# Patient Record
Sex: FEMALE | Race: WHITE | NOT HISPANIC OR LATINO | Employment: FULL TIME | URBAN - METROPOLITAN AREA
[De-identification: names, ages, dates, MRNs, and addresses within clinical notes are randomized per-mention and may not be internally consistent; named-entity substitution may affect disease eponyms.]

---

## 2023-08-24 ENCOUNTER — OFFICE VISIT (OUTPATIENT)
Dept: PRIMARY CARE | Facility: CLINIC | Age: 63
End: 2023-08-24
Payer: COMMERCIAL

## 2023-08-24 ENCOUNTER — LAB (OUTPATIENT)
Dept: LAB | Facility: LAB | Age: 63
End: 2023-08-24
Payer: COMMERCIAL

## 2023-08-24 VITALS
TEMPERATURE: 97.1 F | RESPIRATION RATE: 16 BRPM | SYSTOLIC BLOOD PRESSURE: 126 MMHG | BODY MASS INDEX: 24.19 KG/M2 | HEIGHT: 68 IN | OXYGEN SATURATION: 97 % | WEIGHT: 159.6 LBS | HEART RATE: 103 BPM | DIASTOLIC BLOOD PRESSURE: 62 MMHG

## 2023-08-24 DIAGNOSIS — E11.9 TYPE 2 DIABETES MELLITUS WITHOUT COMPLICATION, WITHOUT LONG-TERM CURRENT USE OF INSULIN (MULTI): ICD-10-CM

## 2023-08-24 DIAGNOSIS — I10 PRIMARY HYPERTENSION: ICD-10-CM

## 2023-08-24 DIAGNOSIS — Z12.11 COLON CANCER SCREENING: ICD-10-CM

## 2023-08-24 DIAGNOSIS — F41.9 ANXIETY AND DEPRESSION: ICD-10-CM

## 2023-08-24 DIAGNOSIS — F32.A ANXIETY AND DEPRESSION: ICD-10-CM

## 2023-08-24 DIAGNOSIS — R23.2 HOT FLASHES: ICD-10-CM

## 2023-08-24 DIAGNOSIS — Z12.31 ENCOUNTER FOR SCREENING MAMMOGRAM FOR MALIGNANT NEOPLASM OF BREAST: ICD-10-CM

## 2023-08-24 PROBLEM — J45.901 ASTHMA EXACERBATION (HHS-HCC): Status: ACTIVE | Noted: 2023-08-24

## 2023-08-24 PROBLEM — F40.230: Status: ACTIVE | Noted: 2023-08-24

## 2023-08-24 PROBLEM — T78.40XA ALLERGIES: Status: ACTIVE | Noted: 2023-08-24

## 2023-08-24 PROBLEM — J45.909 ASTHMA (HHS-HCC): Status: ACTIVE | Noted: 2023-08-24

## 2023-08-24 PROBLEM — T14.8XXA MUSCLE STRAIN: Status: ACTIVE | Noted: 2023-08-24

## 2023-08-24 PROBLEM — L65.9 ALOPECIA: Status: ACTIVE | Noted: 2023-08-24

## 2023-08-24 PROBLEM — R03.0 BORDERLINE HYPERTENSION: Status: ACTIVE | Noted: 2023-08-24

## 2023-08-24 PROBLEM — R20.0 NUMBNESS IN FEET: Status: ACTIVE | Noted: 2023-08-24

## 2023-08-24 PROBLEM — M25.551 HIP PAIN, RIGHT: Status: ACTIVE | Noted: 2023-08-24

## 2023-08-24 PROBLEM — H53.9 VISUAL DISTURBANCE: Status: ACTIVE | Noted: 2023-08-24

## 2023-08-24 PROBLEM — G62.9 PN (PERIPHERAL NEUROPATHY): Status: ACTIVE | Noted: 2023-08-24

## 2023-08-24 PROBLEM — R10.31 RIGHT INGUINAL PAIN: Status: ACTIVE | Noted: 2023-08-24

## 2023-08-24 LAB
ALANINE AMINOTRANSFERASE (SGPT) (U/L) IN SER/PLAS: 22 U/L (ref 7–45)
ALBUMIN (G/DL) IN SER/PLAS: 4.7 G/DL (ref 3.4–5)
ALBUMIN (MG/L) IN URINE: 20.4 MG/L
ALBUMIN/CREATININE (UG/MG) IN URINE: 10.7 UG/MG CRT (ref 0–30)
ALKALINE PHOSPHATASE (U/L) IN SER/PLAS: 68 U/L (ref 33–136)
ANION GAP IN SER/PLAS: 14 MMOL/L (ref 10–20)
ASPARTATE AMINOTRANSFERASE (SGOT) (U/L) IN SER/PLAS: 24 U/L (ref 9–39)
BASOPHILS (10*3/UL) IN BLOOD BY AUTOMATED COUNT: 0.06 X10E9/L (ref 0–0.1)
BASOPHILS/100 LEUKOCYTES IN BLOOD BY AUTOMATED COUNT: 0.9 % (ref 0–2)
BILIRUBIN TOTAL (MG/DL) IN SER/PLAS: 0.8 MG/DL (ref 0–1.2)
CALCIUM (MG/DL) IN SER/PLAS: 10.1 MG/DL (ref 8.6–10.3)
CARBON DIOXIDE, TOTAL (MMOL/L) IN SER/PLAS: 25 MMOL/L (ref 21–32)
CHLORIDE (MMOL/L) IN SER/PLAS: 103 MMOL/L (ref 98–107)
CHOLESTEROL (MG/DL) IN SER/PLAS: 327 MG/DL (ref 0–199)
CHOLESTEROL IN HDL (MG/DL) IN SER/PLAS: 54.1 MG/DL
CHOLESTEROL/HDL RATIO: 6
CREATININE (MG/DL) IN SER/PLAS: 1.5 MG/DL (ref 0.5–1.05)
CREATININE (MG/DL) IN URINE: 191 MG/DL (ref 20–320)
EOSINOPHILS (10*3/UL) IN BLOOD BY AUTOMATED COUNT: 0.09 X10E9/L (ref 0–0.7)
EOSINOPHILS/100 LEUKOCYTES IN BLOOD BY AUTOMATED COUNT: 1.3 % (ref 0–6)
ERYTHROCYTE DISTRIBUTION WIDTH (RATIO) BY AUTOMATED COUNT: 13.3 % (ref 11.5–14.5)
ERYTHROCYTE MEAN CORPUSCULAR HEMOGLOBIN CONCENTRATION (G/DL) BY AUTOMATED: 32 G/DL (ref 32–36)
ERYTHROCYTE MEAN CORPUSCULAR VOLUME (FL) BY AUTOMATED COUNT: 93 FL (ref 80–100)
ERYTHROCYTES (10*6/UL) IN BLOOD BY AUTOMATED COUNT: 4.64 X10E12/L (ref 4–5.2)
ESTIMATED AVERAGE GLUCOSE FOR HBA1C: 134 MG/DL
GFR FEMALE: 39 ML/MIN/1.73M2
GLUCOSE (MG/DL) IN SER/PLAS: 151 MG/DL (ref 74–99)
HEMATOCRIT (%) IN BLOOD BY AUTOMATED COUNT: 43.1 % (ref 36–46)
HEMOGLOBIN (G/DL) IN BLOOD: 13.8 G/DL (ref 12–16)
HEMOGLOBIN A1C/HEMOGLOBIN TOTAL IN BLOOD: 6.3 %
IMMATURE GRANULOCYTES/100 LEUKOCYTES IN BLOOD BY AUTOMATED COUNT: 0.3 % (ref 0–0.9)
LDL: 237 MG/DL (ref 0–99)
LEUKOCYTES (10*3/UL) IN BLOOD BY AUTOMATED COUNT: 6.9 X10E9/L (ref 4.4–11.3)
LYMPHOCYTES (10*3/UL) IN BLOOD BY AUTOMATED COUNT: 1.51 X10E9/L (ref 1.2–4.8)
LYMPHOCYTES/100 LEUKOCYTES IN BLOOD BY AUTOMATED COUNT: 21.8 % (ref 13–44)
MONOCYTES (10*3/UL) IN BLOOD BY AUTOMATED COUNT: 0.65 X10E9/L (ref 0.1–1)
MONOCYTES/100 LEUKOCYTES IN BLOOD BY AUTOMATED COUNT: 9.4 % (ref 2–10)
NEUTROPHILS (10*3/UL) IN BLOOD BY AUTOMATED COUNT: 4.59 X10E9/L (ref 1.2–7.7)
NEUTROPHILS/100 LEUKOCYTES IN BLOOD BY AUTOMATED COUNT: 66.3 % (ref 40–80)
PLATELETS (10*3/UL) IN BLOOD AUTOMATED COUNT: 253 X10E9/L (ref 150–450)
POTASSIUM (MMOL/L) IN SER/PLAS: 4.3 MMOL/L (ref 3.5–5.3)
PROTEIN TOTAL: 7.8 G/DL (ref 6.4–8.2)
SODIUM (MMOL/L) IN SER/PLAS: 138 MMOL/L (ref 136–145)
TRIGLYCERIDE (MG/DL) IN SER/PLAS: 179 MG/DL (ref 0–149)
UREA NITROGEN (MG/DL) IN SER/PLAS: 23 MG/DL (ref 6–23)
VLDL: 36 MG/DL (ref 0–40)

## 2023-08-24 PROCEDURE — 3008F BODY MASS INDEX DOCD: CPT | Performed by: FAMILY MEDICINE

## 2023-08-24 PROCEDURE — 99214 OFFICE O/P EST MOD 30 MIN: CPT | Performed by: FAMILY MEDICINE

## 2023-08-24 PROCEDURE — 82043 UR ALBUMIN QUANTITATIVE: CPT

## 2023-08-24 PROCEDURE — 83036 HEMOGLOBIN GLYCOSYLATED A1C: CPT

## 2023-08-24 PROCEDURE — 3078F DIAST BP <80 MM HG: CPT | Performed by: FAMILY MEDICINE

## 2023-08-24 PROCEDURE — 85025 COMPLETE CBC W/AUTO DIFF WBC: CPT

## 2023-08-24 PROCEDURE — 4010F ACE/ARB THERAPY RXD/TAKEN: CPT | Performed by: FAMILY MEDICINE

## 2023-08-24 PROCEDURE — 82570 ASSAY OF URINE CREATININE: CPT

## 2023-08-24 PROCEDURE — 80061 LIPID PANEL: CPT

## 2023-08-24 PROCEDURE — 3074F SYST BP LT 130 MM HG: CPT | Performed by: FAMILY MEDICINE

## 2023-08-24 PROCEDURE — 36415 COLL VENOUS BLD VENIPUNCTURE: CPT

## 2023-08-24 PROCEDURE — 80053 COMPREHEN METABOLIC PANEL: CPT

## 2023-08-24 RX ORDER — OLMESARTAN MEDOXOMIL 20 MG/1
20 TABLET ORAL DAILY
COMMUNITY
Start: 2023-08-17 | End: 2023-11-13

## 2023-08-24 RX ORDER — FLUTICASONE FUROATE, UMECLIDINIUM BROMIDE AND VILANTEROL TRIFENATATE 100; 62.5; 25 UG/1; UG/1; UG/1
1 POWDER RESPIRATORY (INHALATION) DAILY
COMMUNITY
End: 2023-09-05 | Stop reason: SDUPTHER

## 2023-08-24 RX ORDER — FLUOXETINE 10 MG/1
10 CAPSULE ORAL DAILY
Qty: 30 CAPSULE | Refills: 5 | Status: SHIPPED | OUTPATIENT
Start: 2023-08-24 | End: 2024-02-12

## 2023-08-24 RX ORDER — BLOOD SUGAR DIAGNOSTIC
1 STRIP MISCELLANEOUS 2 TIMES DAILY
COMMUNITY
End: 2023-08-24 | Stop reason: ALTCHOICE

## 2023-08-24 RX ORDER — ALBUTEROL SULFATE 90 UG/1
AEROSOL, METERED RESPIRATORY (INHALATION)
COMMUNITY
Start: 2020-02-03 | End: 2024-03-01 | Stop reason: SDUPTHER

## 2023-08-24 RX ORDER — METFORMIN HYDROCHLORIDE 500 MG/1
500 TABLET ORAL 2 TIMES DAILY
COMMUNITY
End: 2023-08-24 | Stop reason: ALTCHOICE

## 2023-08-24 RX ORDER — CETIRIZINE HYDROCHLORIDE 10 MG/1
1 TABLET ORAL DAILY
COMMUNITY
Start: 2020-09-02 | End: 2023-08-24 | Stop reason: ALTCHOICE

## 2023-08-24 ASSESSMENT — PATIENT HEALTH QUESTIONNAIRE - PHQ9
2. FEELING DOWN, DEPRESSED OR HOPELESS: SEVERAL DAYS
SUM OF ALL RESPONSES TO PHQ9 QUESTIONS 1 AND 2: 1
10. IF YOU CHECKED OFF ANY PROBLEMS, HOW DIFFICULT HAVE THESE PROBLEMS MADE IT FOR YOU TO DO YOUR WORK, TAKE CARE OF THINGS AT HOME, OR GET ALONG WITH OTHER PEOPLE: SOMEWHAT DIFFICULT
1. LITTLE INTEREST OR PLEASURE IN DOING THINGS: NOT AT ALL

## 2023-08-24 NOTE — PROGRESS NOTES
Subjective   Patient ID: Aruna De La Rosa is a 63 y.o. female who presents for Anxiety.    HPI    Anxiety  Ongoing for 2 week  SXS:  excessive worry, irritability, palpitations, nervousness, CP  Also night sweat and heat and cold intolerance   Pt is going thru a begin of of break up with fiance   Work is okay.  She is in the process of house hunting.  Denies wanting to harm self, or others.  She is working for full time.  She states her concentration is off, all of sudden she will want to cry.  Denies difficulty sleeping, except night sweats.  Night sweats started with diabetes.  She states she has lost 10 pounds.    She states she has not been on any sugar medications.  She states her sugars have been good.  She gets up once at night to urinate.  Has not been excessively thirsty.    Has had increased belching.  Still has appendix.  Denies abdominal pain.  Did have some right flank pain yesterday, has resolved.  Does not drink milk.    No other concern    Review of systems  ; Patient seen today for exam denies any problems with headaches or vision, denies any shortness of breath chest pain nausea or vomiting, no black stool no blood in the stool no heartburn type symptoms denies any problems with constipation or diarrhea, and no dysuria-type symptoms    The patient's allergies medications were reviewed with them today    The patient's social family and surgical history or also reviewed here today, along with her past medical history.     Objective     Alert and active in  no acute distress  HEENT TMs clear oropharynx negative nares clear no drainage noted neck supple  With no adenopathy   Heart regular rate and rhythm without murmur and no carotid bruits  Lungs- clear to auscultation bilaterally, no wheeze or rhonchi noted  Thyroid -negative masses or nodularity  Abdomen- soft times four quadrants , bowel sounds positive no masses or organomegaly, negative tenderness guarding or rebound  Neurological exam  "unremarkable- DTRs in upper and lower extremities within normal limits.   skin -no lesions noted      /62 (BP Location: Right arm, Patient Position: Sitting, BP Cuff Size: Adult)   Pulse 103   Temp 36.2 °C (97.1 °F) (Temporal)   Resp 16   Ht 1.727 m (5' 8\")   Wt 72.4 kg (159 lb 9.6 oz)   SpO2 97%   BMI 24.27 kg/m²     No Known Allergies    Assessment/Plan   Problem List Items Addressed This Visit       HTN (hypertension)    Type 2 diabetes mellitus without complication (CMS/HCC)     Other Visit Diagnoses       Encounter for screening mammogram for malignant neoplasm of breast        Colon cancer screening        BMI 24.0-24.9, adult              Mammogram ordered.    Cologuard ordered.    She can try Black Cohosh or Amberene OTC for hot flashes.    Labs have been ordered, she/he will have these performed and we will contact her/him with results.  (CBC, CMP, Lipid, A1C, Albumin)    If anything worsens or changes please call us at once, follow up in the office as planned.    Scribe Attestation  By signing my name below, I, Katrina Porter MA, Scribe   attest that this documentation has been prepared under the direction and in the presence of Rajiv Soriano DO.     "

## 2023-08-25 DIAGNOSIS — E78.2 MIXED HYPERLIPIDEMIA: ICD-10-CM

## 2023-08-25 RX ORDER — ROSUVASTATIN CALCIUM 10 MG/1
10 TABLET, COATED ORAL DAILY
COMMUNITY
End: 2023-08-25 | Stop reason: SDUPTHER

## 2023-08-25 NOTE — TELEPHONE ENCOUNTER
[facility] [id number] [id number] Clinical Support Staff  Reviewed her labs her sugars are doing excellent down to 6.3,, she can hold off on medicines if she continues to watch her diet as she has been,, all her other labs are normal except her cholesterol,, the labs reviewed were all normal, except the cholesterol was elevated, we recommend less beef, less fried foods,, and more fruits and vegetables and fish in the diet, with her having diabetes I recommend a cholesterol pill Crestor 10 mg nightly #30 with 2 refills and then recheck lipid profile and ALT in 6 to 8 weeks per lab visit

## 2023-08-28 ENCOUNTER — TELEPHONE (OUTPATIENT)
Dept: PRIMARY CARE | Facility: CLINIC | Age: 63
End: 2023-08-28
Payer: COMMERCIAL

## 2023-08-28 RX ORDER — ROSUVASTATIN CALCIUM 10 MG/1
10 TABLET, COATED ORAL DAILY
Qty: 30 TABLET | Refills: 2 | Status: SHIPPED | OUTPATIENT
Start: 2023-08-28 | End: 2024-01-18

## 2023-08-28 NOTE — TELEPHONE ENCOUNTER
Patient calling, reports that you started her on Fluoxetine on 8-24-23. She reports that she has been on this now for 4 days and has had no improvement.     Please advise

## 2023-09-03 DIAGNOSIS — J45.909 ASTHMA, UNSPECIFIED ASTHMA SEVERITY, UNSPECIFIED WHETHER COMPLICATED, UNSPECIFIED WHETHER PERSISTENT (HHS-HCC): ICD-10-CM

## 2023-09-05 RX ORDER — FLUTICASONE FUROATE, UMECLIDINIUM BROMIDE AND VILANTEROL TRIFENATATE 100; 62.5; 25 UG/1; UG/1; UG/1
1 POWDER RESPIRATORY (INHALATION) DAILY
Qty: 60 EACH | Refills: 4 | Status: SHIPPED | OUTPATIENT
Start: 2023-09-05 | End: 2023-09-08 | Stop reason: SDUPTHER

## 2023-09-05 NOTE — TELEPHONE ENCOUNTER
Per Dr. Soriano : It takes 2 to 3 weeks to kick in,, we started a low dose if she is not feeling improvement after 2 weeks will increase the dose      Patient aware

## 2023-09-08 RX ORDER — FLUTICASONE FUROATE, UMECLIDINIUM BROMIDE AND VILANTEROL TRIFENATATE 100; 62.5; 25 UG/1; UG/1; UG/1
1 POWDER RESPIRATORY (INHALATION) DAILY
Qty: 60 EACH | Refills: 4 | Status: SHIPPED | OUTPATIENT
Start: 2023-09-08 | End: 2024-02-09 | Stop reason: SDUPTHER

## 2023-09-08 NOTE — TELEPHONE ENCOUNTER
Spoke with the pharmacy and they DO NOT have this prescription. Can we re-send this. The patient is out of medication     Medication Name:  Trelegy 100-62.5-25 mcg  Quantity (Optional):    60 Refill: 4  Directions (Optional):   INHALE ONE PUFF BY MOUTH ONCE DAILY     Specific Pharmacy location:  [facility]     Date of last appointment:  8/24/23

## 2023-11-04 DIAGNOSIS — E11.9 TYPE 2 DIABETES MELLITUS WITHOUT COMPLICATION, WITHOUT LONG-TERM CURRENT USE OF INSULIN (MULTI): ICD-10-CM

## 2023-11-06 RX ORDER — LANCETS
EACH MISCELLANEOUS
Qty: 200 EACH | Refills: 1 | Status: SHIPPED | OUTPATIENT
Start: 2023-11-06 | End: 2024-03-07

## 2023-11-08 DIAGNOSIS — E11.9 TYPE 2 DIABETES MELLITUS WITHOUT COMPLICATION, WITHOUT LONG-TERM CURRENT USE OF INSULIN (MULTI): ICD-10-CM

## 2023-11-08 RX ORDER — LANCETS
EACH MISCELLANEOUS
Refills: 0 | OUTPATIENT
Start: 2023-11-08

## 2023-11-10 DIAGNOSIS — I10 HTN (HYPERTENSION), BENIGN: Primary | ICD-10-CM

## 2023-11-13 RX ORDER — OLMESARTAN MEDOXOMIL 20 MG/1
20 TABLET ORAL DAILY
Qty: 90 TABLET | Refills: 0 | Status: SHIPPED | OUTPATIENT
Start: 2023-11-13 | End: 2024-03-01 | Stop reason: SDUPTHER

## 2023-11-16 ENCOUNTER — TELEPHONE (OUTPATIENT)
Dept: PRIMARY CARE | Facility: CLINIC | Age: 63
End: 2023-11-16
Payer: COMMERCIAL

## 2024-01-18 DIAGNOSIS — E78.2 MIXED HYPERLIPIDEMIA: ICD-10-CM

## 2024-01-18 RX ORDER — ROSUVASTATIN CALCIUM 10 MG/1
10 TABLET, COATED ORAL DAILY
Qty: 30 TABLET | Refills: 1 | Status: SHIPPED | OUTPATIENT
Start: 2024-01-18 | End: 2024-03-01 | Stop reason: SDUPTHER

## 2024-02-09 DIAGNOSIS — J45.909 ASTHMA, UNSPECIFIED ASTHMA SEVERITY, UNSPECIFIED WHETHER COMPLICATED, UNSPECIFIED WHETHER PERSISTENT (HHS-HCC): ICD-10-CM

## 2024-02-09 RX ORDER — FLUTICASONE FUROATE, UMECLIDINIUM BROMIDE AND VILANTEROL TRIFENATATE 100; 62.5; 25 UG/1; UG/1; UG/1
1 POWDER RESPIRATORY (INHALATION) DAILY
Qty: 60 EACH | Refills: 0 | Status: SHIPPED | OUTPATIENT
Start: 2024-02-09 | End: 2024-03-01 | Stop reason: SDUPTHER

## 2024-02-12 DIAGNOSIS — F41.9 ANXIETY AND DEPRESSION: ICD-10-CM

## 2024-02-12 DIAGNOSIS — F32.A ANXIETY AND DEPRESSION: ICD-10-CM

## 2024-02-12 RX ORDER — FLUOXETINE 10 MG/1
10 CAPSULE ORAL DAILY
Qty: 30 CAPSULE | Refills: 0 | Status: SHIPPED | OUTPATIENT
Start: 2024-02-12 | End: 2024-03-01 | Stop reason: SDUPTHER

## 2024-03-01 ENCOUNTER — OFFICE VISIT (OUTPATIENT)
Dept: PRIMARY CARE | Facility: CLINIC | Age: 64
End: 2024-03-01
Payer: COMMERCIAL

## 2024-03-01 VITALS
DIASTOLIC BLOOD PRESSURE: 62 MMHG | HEART RATE: 87 BPM | BODY MASS INDEX: 24.43 KG/M2 | RESPIRATION RATE: 16 BRPM | WEIGHT: 161.2 LBS | SYSTOLIC BLOOD PRESSURE: 118 MMHG | OXYGEN SATURATION: 95 % | HEIGHT: 68 IN | TEMPERATURE: 97.4 F

## 2024-03-01 DIAGNOSIS — E78.2 MIXED HYPERLIPIDEMIA: ICD-10-CM

## 2024-03-01 DIAGNOSIS — F32.A ANXIETY AND DEPRESSION: ICD-10-CM

## 2024-03-01 DIAGNOSIS — I10 HTN (HYPERTENSION), BENIGN: ICD-10-CM

## 2024-03-01 DIAGNOSIS — L65.9 ALOPECIA: ICD-10-CM

## 2024-03-01 DIAGNOSIS — F41.9 ANXIETY DISORDER, UNSPECIFIED TYPE: ICD-10-CM

## 2024-03-01 DIAGNOSIS — M81.0 OSTEOPOROSIS, UNSPECIFIED OSTEOPOROSIS TYPE, UNSPECIFIED PATHOLOGICAL FRACTURE PRESENCE: ICD-10-CM

## 2024-03-01 DIAGNOSIS — F41.9 ANXIETY AND DEPRESSION: ICD-10-CM

## 2024-03-01 DIAGNOSIS — E11.9 TYPE 2 DIABETES MELLITUS WITHOUT COMPLICATION, UNSPECIFIED WHETHER LONG TERM INSULIN USE (MULTI): ICD-10-CM

## 2024-03-01 DIAGNOSIS — G58.9 MONONEUROPATHY: ICD-10-CM

## 2024-03-01 DIAGNOSIS — Z00.00 ROUTINE GENERAL MEDICAL EXAMINATION AT A HEALTH CARE FACILITY: Primary | ICD-10-CM

## 2024-03-01 DIAGNOSIS — J45.909 ASTHMA, UNSPECIFIED ASTHMA SEVERITY, UNSPECIFIED WHETHER COMPLICATED, UNSPECIFIED WHETHER PERSISTENT (HHS-HCC): ICD-10-CM

## 2024-03-01 DIAGNOSIS — R03.0 BORDERLINE HYPERTENSION: ICD-10-CM

## 2024-03-01 PROCEDURE — 99396 PREV VISIT EST AGE 40-64: CPT | Performed by: FAMILY MEDICINE

## 2024-03-01 PROCEDURE — 3074F SYST BP LT 130 MM HG: CPT | Performed by: FAMILY MEDICINE

## 2024-03-01 PROCEDURE — 3008F BODY MASS INDEX DOCD: CPT | Performed by: FAMILY MEDICINE

## 2024-03-01 PROCEDURE — 4010F ACE/ARB THERAPY RXD/TAKEN: CPT | Performed by: FAMILY MEDICINE

## 2024-03-01 PROCEDURE — 3078F DIAST BP <80 MM HG: CPT | Performed by: FAMILY MEDICINE

## 2024-03-01 RX ORDER — FLUTICASONE FUROATE, UMECLIDINIUM BROMIDE AND VILANTEROL TRIFENATATE 100; 62.5; 25 UG/1; UG/1; UG/1
1 POWDER RESPIRATORY (INHALATION) DAILY
Qty: 60 EACH | Refills: 2 | Status: SHIPPED | OUTPATIENT
Start: 2024-03-01 | End: 2024-03-14

## 2024-03-01 RX ORDER — ALBUTEROL SULFATE 90 UG/1
1 AEROSOL, METERED RESPIRATORY (INHALATION) EVERY 6 HOURS PRN
Qty: 18 G | Refills: 1 | Status: SHIPPED | OUTPATIENT
Start: 2024-03-01 | End: 2024-05-10

## 2024-03-01 RX ORDER — OLMESARTAN MEDOXOMIL 20 MG/1
20 TABLET ORAL DAILY
Qty: 30 TABLET | Refills: 5 | Status: SHIPPED | OUTPATIENT
Start: 2024-03-01

## 2024-03-01 RX ORDER — ROSUVASTATIN CALCIUM 10 MG/1
10 TABLET, COATED ORAL DAILY
Qty: 30 TABLET | Refills: 5 | Status: SHIPPED | OUTPATIENT
Start: 2024-03-01

## 2024-03-01 RX ORDER — FLUOXETINE 10 MG/1
10 CAPSULE ORAL DAILY
Qty: 30 CAPSULE | Refills: 5 | Status: SHIPPED | OUTPATIENT
Start: 2024-03-01 | End: 2024-04-27 | Stop reason: HOSPADM

## 2024-03-01 ASSESSMENT — PATIENT HEALTH QUESTIONNAIRE - PHQ9
2. FEELING DOWN, DEPRESSED OR HOPELESS: NOT AT ALL
1. LITTLE INTEREST OR PLEASURE IN DOING THINGS: NOT AT ALL
SUM OF ALL RESPONSES TO PHQ9 QUESTIONS 1 AND 2: 0

## 2024-03-01 NOTE — PROGRESS NOTES
Subjective   Patient ID: Aruna De La Rosa is a 63 y.o. female who presents for Annual Exam, Hypertension, and Hyperlipidemia.    HPI    Annual physical   Eats a generally healthy diet   Exercises 3 x week  Denies any chest pain,SOB  No Abdominal pain   No black or bloody stools   Urination/BM normal   Last eye apt 6/23  Last dental apt 6 month  Last Gyn apt 10 years  No new family h/o cancers or heart disease    HTN and HLD follow up  Denies swelling, headaches, lightheadedness or dizziness.   Does not check BP at home.   Currently taking olmesartan, rosuvastatin     States she has been waking up in the morning congested.  She states the drainage becomes so hard that she cannot breath through her nose.     No other concern /question       Review of systems  ; Patient seen today for exam denies any problems with headaches or vision, denies any shortness of breath chest pain nausea or vomiting, no black stool no blood in the stool no heartburn type symptoms denies any problems with constipation or diarrhea, and no dysuria-type symptoms    The patient's allergies medications were reviewed with them today    The patient's social family and surgical history or also reviewed here today, along with her past medical history.     Objective     Alert and active in  no acute distress  HEENT TMs clear oropharynx negative nares clear no drainage noted neck supple  With no adenopathy   Heart regular rate and rhythm without murmur and no carotid bruits  Lungs- clear to auscultation bilaterally, no wheeze or rhonchi noted  Thyroid -negative masses or nodularity  Abdomen- soft times four quadrants , bowel sounds positive no masses or organomegaly, negative tenderness guarding or rebound  Neurological exam unremarkable- DTRs in upper and lower extremities within normal limits.   skin -no lesions noted      /62 (BP Location: Left arm, Patient Position: Sitting, BP Cuff Size: Adult)   Pulse 87   Temp 36.3 °C (97.4 °F) (Temporal)   " Resp 16   Ht 1.727 m (5' 8\")   Wt 73.1 kg (161 lb 3.2 oz)   SpO2 95%   BMI 24.51 kg/m²     No Known Allergies    Assessment/Plan   Problem List Items Addressed This Visit       Alopecia    Anxiety disorder    Asthma    Borderline hypertension    PN (peripheral neuropathy)    Type 2 diabetes mellitus without complication (CMS/[facility])    BMI 24.0-24.9, adult    Routine general medical examination at a health care facility - Primary     Other Visit Diagnoses       Anxiety and depression        HTN (hypertension), benign        Mixed hyperlipidemia              Refill Albuterol, Fluoxetine, Trelegy, Olmesartan, Rosuvastatin.  She can try to hold off on the Trelegy to see how she does without it.     Recommend a humidifier in her bedroom.    Reminded to have Cologuard performed.     Reminded to have mammogram performed.  DEXA ordered.    Recommend good posture.    Follow up in 6 months or sooner if necessary.    If anything worsens or changes please call us at once, follow up in the office as planned.    Scribe Attestation  By signing my name below, I, Katrina Porter MA, Scribe   attest that this documentation has been prepared under the direction and in the presence of Rajiv Soriano DO.      "

## 2024-03-07 DIAGNOSIS — E11.9 TYPE 2 DIABETES MELLITUS WITHOUT COMPLICATION, WITHOUT LONG-TERM CURRENT USE OF INSULIN (MULTI): ICD-10-CM

## 2024-03-07 RX ORDER — LANCETS
EACH MISCELLANEOUS
Qty: 200 EACH | Refills: 1 | Status: SHIPPED | OUTPATIENT
Start: 2024-03-07 | End: 2024-04-24 | Stop reason: SDUPTHER

## 2024-03-14 DIAGNOSIS — J45.909 ASTHMA, UNSPECIFIED ASTHMA SEVERITY, UNSPECIFIED WHETHER COMPLICATED, UNSPECIFIED WHETHER PERSISTENT (HHS-HCC): ICD-10-CM

## 2024-03-14 RX ORDER — FLUTICASONE FUROATE, UMECLIDINIUM BROMIDE AND VILANTEROL TRIFENATATE 100; 62.5; 25 UG/1; UG/1; UG/1
1 POWDER RESPIRATORY (INHALATION) DAILY
Qty: 60 EACH | Refills: 2 | Status: SHIPPED | OUTPATIENT
Start: 2024-03-14

## 2024-04-22 ENCOUNTER — OFFICE VISIT (OUTPATIENT)
Dept: PRIMARY CARE | Facility: CLINIC | Age: 64
End: 2024-04-22
Payer: COMMERCIAL

## 2024-04-22 ENCOUNTER — TELEPHONE (OUTPATIENT)
Dept: PRIMARY CARE | Facility: CLINIC | Age: 64
End: 2024-04-22

## 2024-04-22 VITALS
WEIGHT: 160 LBS | SYSTOLIC BLOOD PRESSURE: 124 MMHG | OXYGEN SATURATION: 97 % | TEMPERATURE: 97.8 F | RESPIRATION RATE: 16 BRPM | DIASTOLIC BLOOD PRESSURE: 72 MMHG | HEART RATE: 78 BPM | HEIGHT: 68 IN | BODY MASS INDEX: 24.25 KG/M2

## 2024-04-22 DIAGNOSIS — R10.31 RIGHT LOWER QUADRANT ABDOMINAL PAIN: ICD-10-CM

## 2024-04-22 DIAGNOSIS — N39.0 ACUTE UTI: Primary | ICD-10-CM

## 2024-04-22 LAB
POC APPEARANCE, URINE: ABNORMAL
POC BILIRUBIN, URINE: ABNORMAL
POC BLOOD, URINE: ABNORMAL
POC COLOR, URINE: ABNORMAL
POC GLUCOSE, URINE: ABNORMAL MG/DL
POC KETONES, URINE: ABNORMAL MG/DL
POC LEUKOCYTES, URINE: ABNORMAL
POC NITRITE,URINE: POSITIVE
POC PH, URINE: 5.5 PH
POC PROTEIN, URINE: ABNORMAL MG/DL
POC SPECIFIC GRAVITY, URINE: 1.02
POC UROBILINOGEN, URINE: >=8 EU/DL

## 2024-04-22 PROCEDURE — 3074F SYST BP LT 130 MM HG: CPT | Performed by: FAMILY MEDICINE

## 2024-04-22 PROCEDURE — 4010F ACE/ARB THERAPY RXD/TAKEN: CPT | Performed by: FAMILY MEDICINE

## 2024-04-22 PROCEDURE — 3078F DIAST BP <80 MM HG: CPT | Performed by: FAMILY MEDICINE

## 2024-04-22 PROCEDURE — 99214 OFFICE O/P EST MOD 30 MIN: CPT | Performed by: FAMILY MEDICINE

## 2024-04-22 PROCEDURE — 87086 URINE CULTURE/COLONY COUNT: CPT

## 2024-04-22 PROCEDURE — 81003 URINALYSIS AUTO W/O SCOPE: CPT | Performed by: FAMILY MEDICINE

## 2024-04-22 PROCEDURE — 3008F BODY MASS INDEX DOCD: CPT | Performed by: FAMILY MEDICINE

## 2024-04-22 RX ORDER — CIPROFLOXACIN 250 MG/1
250 TABLET, FILM COATED ORAL 2 TIMES DAILY
Qty: 14 TABLET | Refills: 0 | Status: SHIPPED | OUTPATIENT
Start: 2024-04-22 | End: 2024-04-27 | Stop reason: HOSPADM

## 2024-04-22 NOTE — LETTER
April 22, 2024     Patient: Aruna De La Rosa   YOB: 1960   Date of Visit: 4/22/2024       To Whom It May Concern:    Aruna De La Rosa was seen in my clinic on 4/22/2024  ?. Please excuse Aruna for her absence from work from 4/18/2024, 4/19/2024 and 4/22/2024.    If you have any questions or concerns, please don't hesitate to call.         Sincerely,         Rajiv Soriano, DO

## 2024-04-22 NOTE — PROGRESS NOTES
"Subjective   Patient ID: Aruna De La Rosa is a 63 y.o. female who presents for Abdominal Pain.    HPI    Patient presents today for abdominal pain.  Ongoing x 4 days ago  Describes achy and at times very sharp  Stools are loose for couple day but is getting more folded   Urination no burning, or pain when urinating   Pt abdominal pain is located lower center to the lower right side   Has tried AZO   She is passing gas, and belching.   Admits to nausea.  She did increase fluids.   She did have chills.   She does have an appetite.     No other concern /question    Review of systems  ; Patient seen today for exam denies any problems with headaches or vision, denies any shortness of breath chest pain nausea or vomiting, no black stool no blood in the stool no heartburn type symptoms denies any problems with constipation or diarrhea, and no dysuria-type symptoms    The patient's allergies medications were reviewed with them today    The patient's social family and surgical history or also reviewed here today, along with her past medical history.     Objective     Alert and active in  no acute distress  HEENT TMs clear oropharynx negative nares clear no drainage noted neck supple  With no adenopathy   Heart regular rate and rhythm without murmur and no carotid bruits  Lungs- clear to auscultation bilaterally, no wheeze or rhonchi noted  Thyroid -negative masses or nodularity  Abdomen- soft times four quadrants , bowel sounds positive no masses or organomegaly, slight tenderness in the right lower quadrant no acute rebound or guarding no signs of acute appendicitis at this time, she is also hungry no fevers no bowel changes  skin -no lesions noted      /72 (BP Location: Left arm, Patient Position: Sitting, BP Cuff Size: Adult)   Pulse 78   Temp 36.6 °C (97.8 °F) (Temporal)   Resp 16   Ht 1.727 m (5' 8\")   Wt 72.6 kg (160 lb)   SpO2 97%   BMI 24.33 kg/m²     No Known Allergies    Assessment/Plan   Problem List " Items Addressed This Visit    None  Visit Diagnoses       Right lower quadrant abdominal pain        Relevant Orders    POCT UA Automated manually resulted (Completed)    Acute UTI        Relevant Medications    ciprofloxacin (Cipro) 250 mg tablet    Other Relevant Orders    Urine Culture          UA performed today, trace blood and large amount of leukocytes.    If she gets worsening pain on the right side, she should go to the ER.     Cipro 250 mg BID prescribed today.  We will send her urine for culture we did discuss appendicitis she does not have any other concerning symptoms at this time if she worsens she will let me know her belly pain and starting goes to the right side that is acutely painful she will go to the hospital at once understands this    Will give me an update 24 hours how things are going culture was sent    If anything worsens or changes please call us at once, follow up in the office as planned.    Scribe Attestation  By signing my name below, I, Katrina Porter MA, Scribe   attest that this documentation has been prepared under the direction and in the presence of Rajiv Soriano DO.

## 2024-04-24 ENCOUNTER — TELEPHONE (OUTPATIENT)
Dept: PRIMARY CARE | Facility: CLINIC | Age: 64
End: 2024-04-24
Payer: COMMERCIAL

## 2024-04-24 DIAGNOSIS — E11.9 TYPE 2 DIABETES MELLITUS WITHOUT COMPLICATION, WITHOUT LONG-TERM CURRENT USE OF INSULIN (MULTI): ICD-10-CM

## 2024-04-24 LAB — BACTERIA UR CULT: NO GROWTH

## 2024-04-24 RX ORDER — LANCETS
EACH MISCELLANEOUS
Qty: 200 EACH | Refills: 1 | Status: SHIPPED | OUTPATIENT
Start: 2024-04-24

## 2024-04-24 NOTE — TELEPHONE ENCOUNTER
UPDATE    &    REQUESTING LETTER FOR WORK?    Patient calling giving an update. She is still having pain but it is not as bad as before. Patient says she is belching. Patient is also requesting a letter to excuse her from work for this week (4/22 -4/26) because the of how bad she's been feeling. Patient is feeling nauseated but not but quite as much.    Please advise and thank you.

## 2024-04-24 NOTE — LETTER
April 24, 2024     Patient: Aruna De La Rosa   YOB: 1960   Date of Visit: 4/24/2024       To Whom It May Concern:     ?Please excuse Aruna for her absence from work on this day to make the appointment.    If you have any questions or concerns, please don't hesitate to call.         Sincerely,         Rajiv Soriano, DO

## 2024-04-24 NOTE — LETTER
April 24, 2024     Patient: Aruna De La Rosa   YOB: 1960   Date of Visit: 4/24/2024       To Whom It May Concern:    Please excuse Aruna for her absence from work starting Monday April 22nd through Friday April 26th.  If you have any questions or concerns, please don't hesitate to call.         Sincerely,         Rajiv Soriano, DO

## 2024-04-24 NOTE — LETTER
April 24, 2024     Patient: Aruna De La Rosa   YOB: 1960   Date of Visit: 4/24/2024       To Whom It May Concern:    Aruna De La Rosa was seen in my clinic on 4/24/2024 at ?. Please excuse Aruna for her absence from work starting Monday April 22nd through Friday April 26th.       If you have any questions or concerns, please don't hesitate to call.         Sincerely,         Rajiv Soriano, DO

## 2024-04-24 NOTE — TELEPHONE ENCOUNTER
Rx Refill Request Telephone Encounter    Name:  Aruna De La Rosa  :  [id number]  Medication Name:  Accu-Chek Softclix Lancets misc     Specific Pharmacy location:  [facility]   Date of last appointment:  2024  Date of next appointment: none    Best number to reach patient:  [phone]

## 2024-04-25 NOTE — TELEPHONE ENCOUNTER
Pt calling back, she states that she is not worse but about the same. She wanted to know if you can order the CT scan?    Please advise    Last office visit:4/22/24

## 2024-04-26 ENCOUNTER — APPOINTMENT (OUTPATIENT)
Dept: RADIOLOGY | Facility: HOSPITAL | Age: 64
End: 2024-04-26
Payer: COMMERCIAL

## 2024-04-26 ENCOUNTER — HOSPITAL ENCOUNTER (OUTPATIENT)
Facility: HOSPITAL | Age: 64
Setting detail: OBSERVATION
Discharge: HOME | End: 2024-04-27
Attending: STUDENT IN AN ORGANIZED HEALTH CARE EDUCATION/TRAINING PROGRAM | Admitting: INTERNAL MEDICINE
Payer: COMMERCIAL

## 2024-04-26 DIAGNOSIS — K35.80 ACUTE APPENDICITIS, UNSPECIFIED ACUTE APPENDICITIS TYPE: ICD-10-CM

## 2024-04-26 DIAGNOSIS — K35.30 ACUTE APPENDICITIS WITH LOCALIZED PERITONITIS, WITHOUT PERFORATION, ABSCESS, OR GANGRENE: Primary | ICD-10-CM

## 2024-04-26 LAB
ALBUMIN SERPL BCP-MCNC: 4.4 G/DL (ref 3.4–5)
ALP SERPL-CCNC: 89 U/L (ref 33–136)
ALT SERPL W P-5'-P-CCNC: 15 U/L (ref 7–45)
ANION GAP SERPL CALC-SCNC: 12 MMOL/L (ref 10–20)
APPEARANCE UR: CLEAR
AST SERPL W P-5'-P-CCNC: 16 U/L (ref 9–39)
BASOPHILS # BLD AUTO: 0.05 X10*3/UL (ref 0–0.1)
BASOPHILS NFR BLD AUTO: 0.5 %
BILIRUB SERPL-MCNC: 0.5 MG/DL (ref 0–1.2)
BILIRUB UR STRIP.AUTO-MCNC: NEGATIVE MG/DL
BUN SERPL-MCNC: 13 MG/DL (ref 6–23)
CALCIUM SERPL-MCNC: 9.7 MG/DL (ref 8.6–10.3)
CHLORIDE SERPL-SCNC: 102 MMOL/L (ref 98–107)
CO2 SERPL-SCNC: 28 MMOL/L (ref 21–32)
COLOR UR: YELLOW
CREAT SERPL-MCNC: 1.12 MG/DL (ref 0.5–1.05)
EGFRCR SERPLBLD CKD-EPI 2021: 55 ML/MIN/1.73M*2
EOSINOPHIL # BLD AUTO: 0.16 X10*3/UL (ref 0–0.7)
EOSINOPHIL NFR BLD AUTO: 1.5 %
ERYTHROCYTE [DISTWIDTH] IN BLOOD BY AUTOMATED COUNT: 11.9 % (ref 11.5–14.5)
GLUCOSE BLD MANUAL STRIP-MCNC: 231 MG/DL (ref 74–99)
GLUCOSE BLD MANUAL STRIP-MCNC: 87 MG/DL (ref 74–99)
GLUCOSE SERPL-MCNC: 126 MG/DL (ref 74–99)
GLUCOSE UR STRIP.AUTO-MCNC: NEGATIVE MG/DL
HCT VFR BLD AUTO: 40.6 % (ref 36–46)
HGB BLD-MCNC: 13 G/DL (ref 12–16)
HOLD SPECIMEN: NORMAL
HOLD SPECIMEN: NORMAL
IMM GRANULOCYTES # BLD AUTO: 0.04 X10*3/UL (ref 0–0.7)
IMM GRANULOCYTES NFR BLD AUTO: 0.4 % (ref 0–0.9)
KETONES UR STRIP.AUTO-MCNC: NEGATIVE MG/DL
LEUKOCYTE ESTERASE UR QL STRIP.AUTO: ABNORMAL
LIPASE SERPL-CCNC: 24 U/L (ref 9–82)
LYMPHOCYTES # BLD AUTO: 1.13 X10*3/UL (ref 1.2–4.8)
LYMPHOCYTES NFR BLD AUTO: 10.3 %
MCH RBC QN AUTO: 29.8 PG (ref 26–34)
MCHC RBC AUTO-ENTMCNC: 32 G/DL (ref 32–36)
MCV RBC AUTO: 93 FL (ref 80–100)
MONOCYTES # BLD AUTO: 1.07 X10*3/UL (ref 0.1–1)
MONOCYTES NFR BLD AUTO: 9.8 %
NEUTROPHILS # BLD AUTO: 8.48 X10*3/UL (ref 1.2–7.7)
NEUTROPHILS NFR BLD AUTO: 77.5 %
NITRITE UR QL STRIP.AUTO: NEGATIVE
NRBC BLD-RTO: 0 /100 WBCS (ref 0–0)
PH UR STRIP.AUTO: 6 [PH]
PLATELET # BLD AUTO: 237 X10*3/UL (ref 150–450)
POTASSIUM SERPL-SCNC: 3.6 MMOL/L (ref 3.5–5.3)
PROT SERPL-MCNC: 7.6 G/DL (ref 6.4–8.2)
PROT UR STRIP.AUTO-MCNC: NEGATIVE MG/DL
RBC # BLD AUTO: 4.36 X10*6/UL (ref 4–5.2)
RBC # UR STRIP.AUTO: NEGATIVE /UL
RBC #/AREA URNS AUTO: NORMAL /HPF
SODIUM SERPL-SCNC: 138 MMOL/L (ref 136–145)
SP GR UR STRIP.AUTO: 1.04
SQUAMOUS #/AREA URNS AUTO: NORMAL /HPF
UROBILINOGEN UR STRIP.AUTO-MCNC: <2 MG/DL
WBC # BLD AUTO: 10.9 X10*3/UL (ref 4.4–11.3)
WBC #/AREA URNS AUTO: NORMAL /HPF

## 2024-04-26 PROCEDURE — 87086 URINE CULTURE/COLONY COUNT: CPT | Mod: STJLAB

## 2024-04-26 PROCEDURE — 2500000004 HC RX 250 GENERAL PHARMACY W/ HCPCS (ALT 636 FOR OP/ED): Performed by: INTERNAL MEDICINE

## 2024-04-26 PROCEDURE — 81001 URINALYSIS AUTO W/SCOPE: CPT

## 2024-04-26 PROCEDURE — 80053 COMPREHEN METABOLIC PANEL: CPT

## 2024-04-26 PROCEDURE — 96361 HYDRATE IV INFUSION ADD-ON: CPT | Performed by: INTERNAL MEDICINE

## 2024-04-26 PROCEDURE — 85025 COMPLETE CBC W/AUTO DIFF WBC: CPT

## 2024-04-26 PROCEDURE — 99285 EMERGENCY DEPT VISIT HI MDM: CPT | Performed by: STUDENT IN AN ORGANIZED HEALTH CARE EDUCATION/TRAINING PROGRAM

## 2024-04-26 PROCEDURE — 99222 1ST HOSP IP/OBS MODERATE 55: CPT | Performed by: SURGERY

## 2024-04-26 PROCEDURE — G0378 HOSPITAL OBSERVATION PER HR: HCPCS

## 2024-04-26 PROCEDURE — 96375 TX/PRO/DX INJ NEW DRUG ADDON: CPT | Performed by: INTERNAL MEDICINE

## 2024-04-26 PROCEDURE — 74177 CT ABD & PELVIS W/CONTRAST: CPT

## 2024-04-26 PROCEDURE — 2550000001 HC RX 255 CONTRASTS: Performed by: STUDENT IN AN ORGANIZED HEALTH CARE EDUCATION/TRAINING PROGRAM

## 2024-04-26 PROCEDURE — 99285 EMERGENCY DEPT VISIT HI MDM: CPT | Mod: 25

## 2024-04-26 PROCEDURE — 96372 THER/PROPH/DIAG INJ SC/IM: CPT | Performed by: INTERNAL MEDICINE

## 2024-04-26 PROCEDURE — 96367 TX/PROPH/DG ADDL SEQ IV INF: CPT | Performed by: INTERNAL MEDICINE

## 2024-04-26 PROCEDURE — 74177 CT ABD & PELVIS W/CONTRAST: CPT | Mod: FOREIGN READ | Performed by: RADIOLOGY

## 2024-04-26 PROCEDURE — 82947 ASSAY GLUCOSE BLOOD QUANT: CPT | Mod: 59

## 2024-04-26 PROCEDURE — 2500000004 HC RX 250 GENERAL PHARMACY W/ HCPCS (ALT 636 FOR OP/ED): Performed by: STUDENT IN AN ORGANIZED HEALTH CARE EDUCATION/TRAINING PROGRAM

## 2024-04-26 PROCEDURE — 96365 THER/PROPH/DIAG IV INF INIT: CPT | Mod: 59 | Performed by: INTERNAL MEDICINE

## 2024-04-26 PROCEDURE — 99222 1ST HOSP IP/OBS MODERATE 55: CPT | Performed by: INTERNAL MEDICINE

## 2024-04-26 PROCEDURE — 36415 COLL VENOUS BLD VENIPUNCTURE: CPT

## 2024-04-26 PROCEDURE — 83690 ASSAY OF LIPASE: CPT

## 2024-04-26 PROCEDURE — 2500000001 HC RX 250 WO HCPCS SELF ADMINISTERED DRUGS (ALT 637 FOR MEDICARE OP): Performed by: INTERNAL MEDICINE

## 2024-04-26 PROCEDURE — 96376 TX/PRO/DX INJ SAME DRUG ADON: CPT | Performed by: INTERNAL MEDICINE

## 2024-04-26 RX ORDER — SODIUM CHLORIDE 9 MG/ML
75 INJECTION, SOLUTION INTRAVENOUS CONTINUOUS
Status: ACTIVE | OUTPATIENT
Start: 2024-04-26 | End: 2024-04-27

## 2024-04-26 RX ORDER — DEXTROSE 50 % IN WATER (D50W) INTRAVENOUS SYRINGE
12.5
Status: DISCONTINUED | OUTPATIENT
Start: 2024-04-26 | End: 2024-04-27 | Stop reason: HOSPADM

## 2024-04-26 RX ORDER — ACETAMINOPHEN 325 MG/1
975 TABLET ORAL EVERY 8 HOURS PRN
Status: DISCONTINUED | OUTPATIENT
Start: 2024-04-26 | End: 2024-04-27 | Stop reason: HOSPADM

## 2024-04-26 RX ORDER — CEFTRIAXONE 1 G/50ML
1 INJECTION, SOLUTION INTRAVENOUS EVERY 24 HOURS
Status: DISCONTINUED | OUTPATIENT
Start: 2024-04-26 | End: 2024-04-27

## 2024-04-26 RX ORDER — OXYCODONE HYDROCHLORIDE 5 MG/1
5 TABLET ORAL EVERY 6 HOURS PRN
Status: DISCONTINUED | OUTPATIENT
Start: 2024-04-26 | End: 2024-04-27 | Stop reason: HOSPADM

## 2024-04-26 RX ORDER — DEXTROSE 50 % IN WATER (D50W) INTRAVENOUS SYRINGE
25
Status: DISCONTINUED | OUTPATIENT
Start: 2024-04-26 | End: 2024-04-27 | Stop reason: HOSPADM

## 2024-04-26 RX ORDER — METRONIDAZOLE 500 MG/100ML
500 INJECTION, SOLUTION INTRAVENOUS EVERY 8 HOURS
Status: DISCONTINUED | OUTPATIENT
Start: 2024-04-26 | End: 2024-04-27 | Stop reason: HOSPADM

## 2024-04-26 RX ORDER — ENOXAPARIN SODIUM 100 MG/ML
40 INJECTION SUBCUTANEOUS EVERY 24 HOURS
Status: DISCONTINUED | OUTPATIENT
Start: 2024-04-26 | End: 2024-04-27 | Stop reason: HOSPADM

## 2024-04-26 RX ORDER — INSULIN LISPRO 100 [IU]/ML
0-10 INJECTION, SOLUTION INTRAVENOUS; SUBCUTANEOUS
Status: DISCONTINUED | OUTPATIENT
Start: 2024-04-26 | End: 2024-04-27 | Stop reason: HOSPADM

## 2024-04-26 RX ORDER — ONDANSETRON 4 MG/1
4 TABLET, ORALLY DISINTEGRATING ORAL EVERY 8 HOURS PRN
Status: DISCONTINUED | OUTPATIENT
Start: 2024-04-26 | End: 2024-04-27 | Stop reason: HOSPADM

## 2024-04-26 RX ORDER — ONDANSETRON HYDROCHLORIDE 2 MG/ML
4 INJECTION, SOLUTION INTRAVENOUS EVERY 8 HOURS PRN
Status: DISCONTINUED | OUTPATIENT
Start: 2024-04-26 | End: 2024-04-27 | Stop reason: HOSPADM

## 2024-04-26 RX ADMIN — METRONIDAZOLE 500 MG: 500 INJECTION, SOLUTION INTRAVENOUS at 17:51

## 2024-04-26 RX ADMIN — SODIUM CHLORIDE 75 ML/HR: 9 INJECTION, SOLUTION INTRAVENOUS at 18:53

## 2024-04-26 RX ADMIN — PIPERACILLIN SODIUM AND TAZOBACTAM SODIUM 3.38 G: 3; .375 INJECTION, SOLUTION INTRAVENOUS at 12:06

## 2024-04-26 RX ADMIN — IOHEXOL 75 ML: 350 INJECTION, SOLUTION INTRAVENOUS at 11:10

## 2024-04-26 RX ADMIN — HYDROMORPHONE HYDROCHLORIDE 0.5 MG: 1 INJECTION, SOLUTION INTRAMUSCULAR; INTRAVENOUS; SUBCUTANEOUS at 16:53

## 2024-04-26 RX ADMIN — OXYCODONE HYDROCHLORIDE 5 MG: 5 TABLET ORAL at 22:14

## 2024-04-26 RX ADMIN — ENOXAPARIN SODIUM 40 MG: 40 INJECTION SUBCUTANEOUS at 16:41

## 2024-04-26 RX ADMIN — HYDROMORPHONE HYDROCHLORIDE 0.5 MG: 1 INJECTION, SOLUTION INTRAMUSCULAR; INTRAVENOUS; SUBCUTANEOUS at 12:07

## 2024-04-26 RX ADMIN — CEFTRIAXONE SODIUM 1 G: 1 INJECTION, SOLUTION INTRAVENOUS at 19:04

## 2024-04-26 SDOH — SOCIAL STABILITY: SOCIAL INSECURITY: DO YOU FEEL UNSAFE GOING BACK TO THE PLACE WHERE YOU ARE LIVING?: NO

## 2024-04-26 SDOH — ECONOMIC STABILITY: TRANSPORTATION INSECURITY
IN THE PAST 12 MONTHS, HAS THE LACK OF TRANSPORTATION KEPT YOU FROM MEDICAL APPOINTMENTS OR FROM GETTING MEDICATIONS?: NO

## 2024-04-26 SDOH — ECONOMIC STABILITY: INCOME INSECURITY: IN THE LAST 12 MONTHS, WAS THERE A TIME WHEN YOU WERE NOT ABLE TO PAY THE MORTGAGE OR RENT ON TIME?: NO

## 2024-04-26 SDOH — SOCIAL STABILITY: SOCIAL INSECURITY: ARE YOU OR HAVE YOU BEEN THREATENED OR ABUSED PHYSICALLY, EMOTIONALLY, OR SEXUALLY BY ANYONE?: NO

## 2024-04-26 SDOH — ECONOMIC STABILITY: HOUSING INSECURITY
IN THE LAST 12 MONTHS, WAS THERE A TIME WHEN YOU DID NOT HAVE A STEADY PLACE TO SLEEP OR SLEPT IN A SHELTER (INCLUDING NOW)?: NO

## 2024-04-26 SDOH — ECONOMIC STABILITY: INCOME INSECURITY: HOW HARD IS IT FOR YOU TO PAY FOR THE VERY BASICS LIKE FOOD, HOUSING, MEDICAL CARE, AND HEATING?: NOT HARD AT ALL

## 2024-04-26 SDOH — ECONOMIC STABILITY: TRANSPORTATION INSECURITY
IN THE PAST 12 MONTHS, HAS LACK OF TRANSPORTATION KEPT YOU FROM MEETINGS, WORK, OR FROM GETTING THINGS NEEDED FOR DAILY LIVING?: NO

## 2024-04-26 SDOH — SOCIAL STABILITY: SOCIAL INSECURITY: ABUSE: ADULT

## 2024-04-26 SDOH — SOCIAL STABILITY: SOCIAL INSECURITY: HAS ANYONE EVER THREATENED TO HURT YOUR FAMILY OR YOUR PETS?: NO

## 2024-04-26 SDOH — ECONOMIC STABILITY: HOUSING INSECURITY: IN THE LAST 12 MONTHS, HOW MANY PLACES HAVE YOU LIVED?: 1

## 2024-04-26 SDOH — SOCIAL STABILITY: SOCIAL INSECURITY: HAVE YOU HAD THOUGHTS OF HARMING ANYONE ELSE?: NO

## 2024-04-26 SDOH — SOCIAL STABILITY: SOCIAL INSECURITY: DO YOU FEEL ANYONE HAS EXPLOITED OR TAKEN ADVANTAGE OF YOU FINANCIALLY OR OF YOUR PERSONAL PROPERTY?: NO

## 2024-04-26 SDOH — SOCIAL STABILITY: SOCIAL INSECURITY: ARE THERE ANY APPARENT SIGNS OF INJURIES/BEHAVIORS THAT COULD BE RELATED TO ABUSE/NEGLECT?: NO

## 2024-04-26 SDOH — SOCIAL STABILITY: SOCIAL INSECURITY: DOES ANYONE TRY TO KEEP YOU FROM HAVING/CONTACTING OTHER FRIENDS OR DOING THINGS OUTSIDE YOUR HOME?: NO

## 2024-04-26 SDOH — SOCIAL STABILITY: SOCIAL INSECURITY: HAVE YOU HAD ANY THOUGHTS OF HARMING ANYONE ELSE?: NO

## 2024-04-26 ASSESSMENT — ACTIVITIES OF DAILY LIVING (ADL)
JUDGMENT_ADEQUATE_SAFELY_COMPLETE_DAILY_ACTIVITIES: YES
BATHING: INDEPENDENT
PATIENT'S MEMORY ADEQUATE TO SAFELY COMPLETE DAILY ACTIVITIES?: YES
GROOMING: INDEPENDENT
ADEQUATE_TO_COMPLETE_ADL: YES
WALKS IN HOME: INDEPENDENT
HEARING - RIGHT EAR: FUNCTIONAL
FEEDING YOURSELF: INDEPENDENT
DRESSING YOURSELF: INDEPENDENT
TOILETING: INDEPENDENT
HEARING - LEFT EAR: FUNCTIONAL

## 2024-04-26 ASSESSMENT — COGNITIVE AND FUNCTIONAL STATUS - GENERAL
MOBILITY SCORE: 24
DAILY ACTIVITIY SCORE: 24
PATIENT BASELINE BEDBOUND: NO

## 2024-04-26 ASSESSMENT — COLUMBIA-SUICIDE SEVERITY RATING SCALE - C-SSRS
2. HAVE YOU ACTUALLY HAD ANY THOUGHTS OF KILLING YOURSELF?: NO
6. HAVE YOU EVER DONE ANYTHING, STARTED TO DO ANYTHING, OR PREPARED TO DO ANYTHING TO END YOUR LIFE?: NO
1. IN THE PAST MONTH, HAVE YOU WISHED YOU WERE DEAD OR WISHED YOU COULD GO TO SLEEP AND NOT WAKE UP?: NO

## 2024-04-26 ASSESSMENT — LIFESTYLE VARIABLES
HAVE PEOPLE ANNOYED YOU BY CRITICIZING YOUR DRINKING: NO
TOTAL SCORE: 0
SKIP TO QUESTIONS 9-10: 1
HOW OFTEN DO YOU HAVE 6 OR MORE DRINKS ON ONE OCCASION: NEVER
HOW MANY STANDARD DRINKS CONTAINING ALCOHOL DO YOU HAVE ON A TYPICAL DAY: PATIENT DOES NOT DRINK
EVER FELT BAD OR GUILTY ABOUT YOUR DRINKING: NO
HAVE YOU EVER FELT YOU SHOULD CUT DOWN ON YOUR DRINKING: NO
AUDIT-C TOTAL SCORE: 0
AUDIT-C TOTAL SCORE: 0
EVER HAD A DRINK FIRST THING IN THE MORNING TO STEADY YOUR NERVES TO GET RID OF A HANGOVER: NO
HOW OFTEN DO YOU HAVE A DRINK CONTAINING ALCOHOL: NEVER

## 2024-04-26 ASSESSMENT — PAIN DESCRIPTION - ORIENTATION
ORIENTATION: RIGHT

## 2024-04-26 ASSESSMENT — PATIENT HEALTH QUESTIONNAIRE - PHQ9
2. FEELING DOWN, DEPRESSED OR HOPELESS: NOT AT ALL
1. LITTLE INTEREST OR PLEASURE IN DOING THINGS: NOT AT ALL
SUM OF ALL RESPONSES TO PHQ9 QUESTIONS 1 & 2: 0

## 2024-04-26 ASSESSMENT — PAIN SCALES - WONG BAKER: WONGBAKER_NUMERICALRESPONSE: HURTS LITTLE MORE

## 2024-04-26 ASSESSMENT — PAIN SCALES - GENERAL
PAINLEVEL_OUTOF10: 8
PAINLEVEL_OUTOF10: 4
PAINLEVEL_OUTOF10: 8
PAINLEVEL_OUTOF10: 9
PAINLEVEL_OUTOF10: 8
PAINLEVEL_OUTOF10: 8
PAINLEVEL_OUTOF10: 6
PAINLEVEL_OUTOF10: 8
PAINLEVEL_OUTOF10: 4

## 2024-04-26 ASSESSMENT — PAIN - FUNCTIONAL ASSESSMENT
PAIN_FUNCTIONAL_ASSESSMENT: 0-10

## 2024-04-26 ASSESSMENT — PAIN DESCRIPTION - LOCATION
LOCATION: ABDOMEN
LOCATION: ABDOMEN

## 2024-04-26 ASSESSMENT — PAIN DESCRIPTION - PAIN TYPE: TYPE: ACUTE PAIN

## 2024-04-26 NOTE — H&P
History Of Present Illness  Aruna De La Rosa is a 63 y.o. female presenting with abdominal pain.     Patient is noted to be an adequate historian.  She was seen on the 3 S. observation unit.  She notes that she was having approximately 10 days of symptoms, she spoke to her primary care physician after trying some at home remedies including an over-the-counter that she cannot recall the name of as well as drinking approximately a quart of cranberry juice for which she presumed was a UTI, primary care physician gave a course of ciprofloxacin, the patient notes that some of the symptoms did improve, although she continued to have some lower abdominal discomfort that was fairly consistent.  There was a cramping nature to it and she thought she might have some associated gas from the antibiotics and from an underlying UTI, although she denied having any fevers or chills.  Her symptoms had improved and then slowly started to get worse, she ultimately decided after today that she should come in and get an evaluation in the emergency department after touching base with her primary care physician as well as to facilitate imaging.  She notes that she has not had any headache, fevers or chills, she has had decreased appetite and the ongoing abdominal pain, she had some nausea, although no vomiting, she otherwise denied having any chest pain, cough, peripheral edema, rash.    On arrival to the emergency department she was afebrile, heart rate 94, respiratory rate 16, blood pressure 147/66, SpO2 99% on ambient air; labs did not reveal a leukocytosis although showed a left shift, creatinine 1.12 with otherwise normal electrolytes, there was trace leuk esterase on the urinalysis; CT scan of the abdomen and pelvis showed findings consistent with acute appendicitis due to surrounding mesenteric fat stranding although without notable abscess; as documented by the surgery consultant there is likely a phlegmon that is walled off; there  was also secondary inflammatory changes involving the terminal ileum associated with some mild free fluid in the pelvis as well as an incidental finding of a 2.5 cm lesion in the spleen consistent with a probable hemangioma; surgery service was consulted and recommended the patient come to the hospitalist service for observation overnight, she was admitted to the general medicine team for continued IV antibiotics and to advance her diet as tolerated.    A 12 point review of systems was elicited from the patient and negative except as otherwise documented above in the HPI.    Past Medical History  Past Medical History:   Diagnosis Date    Encounter for immunization 03/03/2021    Encounter for immunization    Encounter for screening for malignant neoplasm of colon 03/04/2020    Screen for colon cancer    Other conditions influencing health status 05/13/2021    History of cough    Other malaise     Malaise and fatigue    Other specified symptoms and signs involving the circulatory and respiratory systems 05/13/2021    Chest congestion    Personal history of other infectious and parasitic diseases 03/31/2022    History of candidiasis of vagina    Personal history of other medical treatment     History of screening mammography    Shortness of breath 05/07/2021    Increasing shortness of breath    Unspecified acute lower respiratory infection 05/13/2021    Lower resp. tract infection       Surgical History  Past Surgical History:   Procedure Laterality Date    OTHER SURGICAL HISTORY  02/03/2020    Shoulder surgery    OTHER SURGICAL HISTORY  02/03/2020    Knee arthroscopy    OTHER SURGICAL HISTORY  02/03/2020    Elbow arthroscopy        Social History  She reports that she has quit smoking. Her smoking use included cigarettes. She has never used smokeless tobacco. She reports current drug use. Drug: Marijuana. She reports that she does not drink alcohol.    Family History  Family History   Family history unknown: Yes       "  Allergies  Patient has no known allergies.    Review of Systems   All other systems reviewed and are negative.    Physical Exam  Vitals reviewed.   Constitutional:       General: She is not in acute distress.     Appearance: Normal appearance. She is not ill-appearing.   HENT:      Head: Normocephalic and atraumatic.      Nose: Nose normal.      Mouth/Throat:      Mouth: Mucous membranes are moist.   Eyes:      General: No scleral icterus.  Cardiovascular:      Rate and Rhythm: Normal rate and regular rhythm.      Pulses: Normal pulses.      Heart sounds: Normal heart sounds. No murmur heard.     No friction rub. No gallop.   Pulmonary:      Effort: Pulmonary effort is normal. No respiratory distress.      Breath sounds: Normal breath sounds. No wheezing, rhonchi or rales.   Abdominal:      General: Abdomen is flat. Bowel sounds are normal. There is no distension.      Palpations: Abdomen is soft. There is no mass.      Tenderness: There is abdominal tenderness. There is no guarding or rebound.   Musculoskeletal:         General: No swelling, tenderness or signs of injury.      Right lower leg: No edema.      Left lower leg: No edema.   Skin:     General: Skin is warm and dry.      Coloration: Skin is not jaundiced or pale.      Findings: No bruising, erythema, lesion or rash.   Neurological:      Mental Status: She is alert and oriented to person, place, and time. Mental status is at baseline.      Motor: No weakness.   Psychiatric:         Mood and Affect: Mood normal.         Behavior: Behavior normal.         Thought Content: Thought content normal.         Judgment: Judgment normal.          Last Recorded Vitals  Blood pressure 159/73, pulse 90, temperature 36 °C (96.8 °F), temperature source Temporal, resp. rate 19, height 1.702 m (5' 7\"), weight 72.6 kg (160 lb), SpO2 99%.    Relevant Results  Scheduled medications  cefTRIAXone, 1 g, intravenous, q24h  enoxaparin, 40 mg, subcutaneous, q24h  insulin lispro, " 0-10 Units, subcutaneous, 4x daily  metroNIDAZOLE, 500 mg, intravenous, q8h      Continuous medications  sodium chloride 0.9%, 75 mL/hr      PRN medications  PRN medications: acetaminophen, dextrose, dextrose, glucagon, glucagon, HYDROmorphone, ondansetron ODT **OR** ondansetron, oxyCODONE    Results for orders placed or performed during the hospital encounter of 04/26/24 (from the past 24 hour(s))   CBC and Auto Differential   Result Value Ref Range    WBC 10.9 4.4 - 11.3 x10*3/uL    nRBC 0.0 0.0 - 0.0 /100 WBCs    RBC 4.36 4.00 - 5.20 x10*6/uL    Hemoglobin 13.0 12.0 - 16.0 g/dL    Hematocrit 40.6 36.0 - 46.0 %    MCV 93 80 - 100 fL    MCH 29.8 26.0 - 34.0 pg    MCHC 32.0 32.0 - 36.0 g/dL    RDW 11.9 11.5 - 14.5 %    Platelets 237 150 - 450 x10*3/uL    Neutrophils % 77.5 40.0 - 80.0 %    Immature Granulocytes %, Automated 0.4 0.0 - 0.9 %    Lymphocytes % 10.3 13.0 - 44.0 %    Monocytes % 9.8 2.0 - 10.0 %    Eosinophils % 1.5 0.0 - 6.0 %    Basophils % 0.5 0.0 - 2.0 %    Neutrophils Absolute 8.48 (H) 1.20 - 7.70 x10*3/uL    Immature Granulocytes Absolute, Automated 0.04 0.00 - 0.70 x10*3/uL    Lymphocytes Absolute 1.13 (L) 1.20 - 4.80 x10*3/uL    Monocytes Absolute 1.07 (H) 0.10 - 1.00 x10*3/uL    Eosinophils Absolute 0.16 0.00 - 0.70 x10*3/uL    Basophils Absolute 0.05 0.00 - 0.10 x10*3/uL   Comprehensive metabolic panel   Result Value Ref Range    Glucose 126 (H) 74 - 99 mg/dL    Sodium 138 136 - 145 mmol/L    Potassium 3.6 3.5 - 5.3 mmol/L    Chloride 102 98 - 107 mmol/L    Bicarbonate 28 21 - 32 mmol/L    Anion Gap 12 10 - 20 mmol/L    Urea Nitrogen 13 6 - 23 mg/dL    Creatinine 1.12 (H) 0.50 - 1.05 mg/dL    eGFR 55 (L) >60 mL/min/1.73m*2    Calcium 9.7 8.6 - 10.3 mg/dL    Albumin 4.4 3.4 - 5.0 g/dL    Alkaline Phosphatase 89 33 - 136 U/L    Total Protein 7.6 6.4 - 8.2 g/dL    AST 16 9 - 39 U/L    Bilirubin, Total 0.5 0.0 - 1.2 mg/dL    ALT 15 7 - 45 U/L   Lipase   Result Value Ref Range    Lipase 24 9 - 82 U/L    PST Top   Result Value Ref Range    Extra Tube Hold for add-ons.    Urinalysis with Reflex Culture and Microscopic   Result Value Ref Range    Color, Urine Yellow Straw, Yellow    Appearance, Urine Clear Clear    Specific Gravity, Urine 1.039 (N) 1.005 - 1.035    pH, Urine 6.0 5.0, 5.5, 6.0, 6.5, 7.0, 7.5, 8.0    Protein, Urine NEGATIVE NEGATIVE mg/dL    Glucose, Urine NEGATIVE NEGATIVE mg/dL    Blood, Urine NEGATIVE NEGATIVE    Ketones, Urine NEGATIVE NEGATIVE mg/dL    Bilirubin, Urine NEGATIVE NEGATIVE    Urobilinogen, Urine <2.0 <2.0 mg/dL    Nitrite, Urine NEGATIVE NEGATIVE    Leukocyte Esterase, Urine TRACE (A) NEGATIVE   Microscopic Only, Urine   Result Value Ref Range    WBC, Urine 1-5 1-5, NONE /HPF    RBC, Urine NONE NONE, 1-2, 3-5 /HPF    Squamous Epithelial Cells, Urine 1-9 (SPARSE) Reference range not established. /HPF   POCT GLUCOSE   Result Value Ref Range    POCT Glucose 87 74 - 99 mg/dL     CT abdomen pelvis w IV contrast    Result Date: 4/26/2024  STUDY: CT Abdomen and Pelvis with IV Contrast; 4/26/2024, 11:17 am INDICATION: Right lower quadrant and suprapubic pain COMPARISON: None Available. ACCESSION NUMBER(S): [id number] ORDERING CLINICIAN: YE FOX TECHNIQUE: CT of the abdomen and pelvis was performed.  Contiguous axial images were obtained at 3 mm slice thickness through the abdomen and pelvis. Coronal and sagittal reconstructions at 3 mm slice thickness were performed.  Omnipaque 350, 75 mL was administered intravenously FINDINGS: LOWER CHEST: No cardiomegaly.  No pericardial effusion.  Lung bases are clear.  ABDOMEN:  LIVER: No hepatomegaly.  Smooth surface contour.  Normal attenuation.  BILE DUCTS: No intrahepatic or extrahepatic biliary ductal dilatation.  GALLBLADDER: The gallbladder is unremarkable. STOMACH: No abnormalities identified.  PANCREAS: No masses or ductal dilatation.  SPLEEN: No splenomegaly is observed. 2.6 cm enhancing mass noted in the medial spleen.  ADRENAL  GLANDS: No thickening or nodules.  KIDNEYS AND URETERS: Kidneys are normal in size and location.  No renal or ureteral calculi.  PELVIS:  BLADDER: There is anterior bladder wall thickening, likely from secondary reactive inflammation.  REPRODUCTIVE ORGANS: No abnormalities identified.  BOWEL: Acutely inflamed appendix is observed, with mural thickening and surrounding fat stranding. There is also secondary inflammatory change involving the terminal ileum. Portions of the right adnexa are also partially involved by inflammatory change. There is no abscess.  VESSELS: No abnormalities identified.  Abdominal aorta is normal in caliber. Scattered calcific plaques noted within the infrarenal aorta.  PERITONEUM/RETROPERITONEUM/LYMPH NODES: No free fluid.  No pneumoperitoneum. No lymphadenopathy.  ABDOMINAL WALL: No abnormalities identified. SOFT TISSUES: No abnormalities identified.  BONES: Geographic sclerotic lesions observed within the femoral heads consistent with AVN. No suspicious bone lesions are seen. Hemangioma observed within L3.    Findings consistent with ACUTE APPENDICITIS with surrounding mesenteric fat stranding. No abscess. Secondary inflammatory change noted involving the terminal ileum. Mild free fluid in the pelvis. There is anterior bladder wall thickening which likely represents secondary cystitis. Indeterminate enhancing mass in the medial spleen measuring 2.5 cm in size, probable hemangioma. Consider follow-up as clinically indicated. NOTIFICATION:  The critical results of the study were discussed with, and acknowledged by  Dr. Batsheva Brandon by telephone on April 26, 2024 at 1152 hours. Signed by Mic Hernandez MD        Assessment/Plan   This is a very pleasant 63-year-old lady with known history of diet-controlled diabetes, hypertension, anxiety, asthma who is presenting with subacute appendicitis incidentally treated with a course of Cipro given thought that it could represent UTI; imaging reveals  likely phlegmon, she will be observed while on IV antibiotics with advancing diet and will likely need outpatient appendectomy several weeks to month after current presentation, feeling improved, requiring IV analgesia due to ongoing discomfort.    Principal Problem:    Acute appendicitis  Active Problems:    Anxiety disorder    Asthma (HHS-HCC)    HTN (hypertension)    PN (peripheral neuropathy)    Type 2 diabetes mellitus without complication (Multi)    Plan:  - Observation, no need for telemetry  - Appreciate surgery  - Can change antibiotics to ceftriaxone and Flagyl after discussion with pharmacy for a kidney favorable regimen as compared to Zosyn  - Analgesia as ordered above  - Patient notes that she was tolerating clear liquids given earlier and request to have full liquid diet available although she does not have a great appetite  - Maintenance IV fluids as ordered  - Okay for VTE prophylaxis given there is no acute surgical need, continues enoxaparin  - Patient is full code  - Anticipate home with no needs as early as tomorrow with follow-up to surgery within 2 to 4 weeks    I spent 45 minutes in the professional and overall care of this patient.      Giles Musa MD

## 2024-04-26 NOTE — ED PROVIDER NOTES
EMERGENCY DEPARTMENT ENCOUNTER      Pt Name: Aruna De La Rosa  MRN: [id number]  Birthdate 1960  Date of evaluation: 4/26/2024    HISTORY OF PRESENT ILLNESS    Aruna De La Rosa is an 63 y.o. female with history including diet-controlled type 2 diabetes, hypertension, hyperlipidemia presenting to the emergency department for worsening abdominal pain suprapubic pain.  Patient started having discomfort with urination increased frequency 4 days ago.  She also had suprapubic pain and discomfort.  She started on Cipro by her PCP.  Her burning and discomfort with urination has improved but her abdominal pain has gotten worse.  She states that now radiates to her right lower quadrant.  She notes some chills and generalized fevers.  She denies any nausea, vomiting, diarrhea, constipation.  Patient has no history of previous abdominal surgery.    PAST MEDICAL HISTORY     Past Medical History:   Diagnosis Date    Encounter for immunization 03/03/2021    Encounter for immunization    Encounter for screening for malignant neoplasm of colon 03/04/2020    Screen for colon cancer    Other conditions influencing health status 05/13/2021    History of cough    Other malaise     Malaise and fatigue    Other specified symptoms and signs involving the circulatory and respiratory systems 05/13/2021    Chest congestion    Personal history of other infectious and parasitic diseases 03/31/2022    History of candidiasis of vagina    Personal history of other medical treatment     History of screening mammography    Shortness of breath 05/07/2021    Increasing shortness of breath    Unspecified acute lower respiratory infection 05/13/2021    Lower resp. tract infection       SURGICAL HISTORY       Past Surgical History:   Procedure Laterality Date    OTHER SURGICAL HISTORY  02/03/2020    Shoulder surgery    OTHER SURGICAL HISTORY  02/03/2020    Knee arthroscopy    OTHER SURGICAL HISTORY  02/03/2020    Elbow arthroscopy       CURRENT MEDICATIONS        Previous Medications    ALBUTEROL 90 MCG/ACTUATION INHALER    Inhale 1 puff every 6 hours if needed for wheezing or shortness of breath.    CIPROFLOXACIN (CIPRO) 250 MG TABLET    Take 1 tablet (250 mg) by mouth 2 times a day for 7 days.    FLUOXETINE (PROZAC) 10 MG CAPSULE    Take 1 capsule (10 mg) by mouth once daily.    LANCETS (ACCU-CHEK SOFTCLIX LANCETS) MISC    USE TO TEST BLOOD SUGAR TWICE DAILY    OLMESARTAN (BENICAR) 20 MG TABLET    Take 1 tablet (20 mg) by mouth once daily.    ROSUVASTATIN (CRESTOR) 10 MG TABLET    Take 1 tablet (10 mg) by mouth once daily.    TRELEGY ELLIPTA 100-62.5-25 MCG BLISTER WITH DEVICE    INHALE ONE PUFF BY MOUTH ONCE DAILY       ALLERGIES     Patient has no known allergies.    FAMILY HISTORY       Family History   Family history unknown: Yes        SOCIAL HISTORY       Social History     Socioeconomic History    Marital status: Single     Spouse name: None    Number of children: None    Years of education: None    Highest education level: None   Occupational History    None   Tobacco Use    Smoking status: Former     Current packs/day: 0.25     Types: Cigarettes    Smokeless tobacco: Never   Vaping Use    Vaping status: Never Used   Substance and Sexual Activity    Alcohol use: Never    Drug use: Yes     Types: Marijuana    Sexual activity: None   Other Topics Concern    None   Social History Narrative    None     Social Determinants of Health     Financial Resource Strain: Not on file   Food Insecurity: Not on file   Transportation Needs: Not on file   Physical Activity: Not on file   Stress: Not on file   Social Connections: Not on file   Intimate Partner Violence: Not on file   Housing Stability: Not on file       PHYSICAL EXAM       ED Triage Vitals [04/26/24 0955]   Temperature Heart Rate Respirations BP   36.2 °C (97.2 °F) 94 16 147/66      Pulse Ox Temp Source Heart Rate Source Patient Position   99 % Temporal Monitor Sitting      BP Location FiO2 (%)     Right arm --        Physical Exam  Vitals and nursing note reviewed.   Constitutional:       General: She is not in acute distress.     Appearance: She is well-developed.   HENT:      Head: Normocephalic and atraumatic.   Eyes:      Conjunctiva/sclera: Conjunctivae normal.   Cardiovascular:      Rate and Rhythm: Normal rate and regular rhythm.      Heart sounds: No murmur heard.  Pulmonary:      Effort: Pulmonary effort is normal. No respiratory distress.      Breath sounds: Normal breath sounds.   Abdominal:      General: Abdomen is flat.      Palpations: Abdomen is soft. There is no hepatomegaly or splenomegaly.      Tenderness: There is abdominal tenderness in the right lower quadrant. There is no guarding or rebound. Positive signs include McBurney's sign. Negative signs include España's sign, Rovsing's sign and psoas sign.      Hernia: No hernia is present.   Musculoskeletal:         General: No swelling.      Cervical back: Neck supple.   Skin:     General: Skin is warm and dry.      Capillary Refill: Capillary refill takes less than 2 seconds.   Neurological:      Mental Status: She is alert.   Psychiatric:         Mood and Affect: Mood normal.          DIAGNOSTIC RESULTS     LABS:  Labs Reviewed   CBC WITH AUTO DIFFERENTIAL - Abnormal       Result Value    WBC 10.9      nRBC 0.0      RBC 4.36      Hemoglobin 13.0      Hematocrit 40.6      MCV 93      MCH 29.8      MCHC 32.0      RDW 11.9      Platelets 237      Neutrophils % 77.5      Immature Granulocytes %, Automated 0.4      Lymphocytes % 10.3      Monocytes % 9.8      Eosinophils % 1.5      Basophils % 0.5      Neutrophils Absolute 8.48 (*)     Immature Granulocytes Absolute, Automated 0.04      Lymphocytes Absolute 1.13 (*)     Monocytes Absolute 1.07 (*)     Eosinophils Absolute 0.16      Basophils Absolute 0.05     COMPREHENSIVE METABOLIC PANEL - Abnormal    Glucose 126 (*)     Sodium 138      Potassium 3.6      Chloride 102      Bicarbonate 28      Anion Gap 12       Urea Nitrogen 13      Creatinine 1.12 (*)     eGFR 55 (*)     Calcium 9.7      Albumin 4.4      Alkaline Phosphatase 89      Total Protein 7.6      AST 16      Bilirubin, Total 0.5      ALT 15     URINALYSIS WITH REFLEX CULTURE AND MICROSCOPIC - Abnormal    Color, Urine Yellow      Appearance, Urine Clear      Specific Gravity, Urine 1.039 (*)     pH, Urine 6.0      Protein, Urine NEGATIVE      Glucose, Urine NEGATIVE      Blood, Urine NEGATIVE      Ketones, Urine NEGATIVE      Bilirubin, Urine NEGATIVE      Urobilinogen, Urine <2.0      Nitrite, Urine NEGATIVE      Leukocyte Esterase, Urine TRACE (*)    LIPASE - Normal    Lipase 24      Narrative:     Venipuncture immediately after or during the administration of Metamizole may lead to falsely low results. Testing should be performed immediately prior to Metamizole dosing.   URINE CULTURE   MICROSCOPIC ONLY, URINE    WBC, Urine 1-5      RBC, Urine NONE      Squamous Epithelial Cells, Urine 1-9 (SPARSE)     URINALYSIS WITH REFLEX CULTURE AND MICROSCOPIC    Narrative:     The following orders were created for panel order Urinalysis with Reflex Culture and Microscopic.  Procedure                               Abnormality         Status                     ---------                               -----------         ------                     Urinalysis with Reflex C...[[id number]]  Abnormal            Final result               Extra Urine Gray Tube[[id number]]                            In process                   Please view results for these tests on the individual orders.   EXTRA URINE GRAY TUBE       All other labs were within normal range or not returned as of this dictation.    Imaging  CT abdomen pelvis w IV contrast   Final Result   Findings consistent with ACUTE APPENDICITIS with surrounding   mesenteric fat stranding. No abscess.   Secondary inflammatory change noted involving the terminal ileum. Mild   free fluid in the pelvis.   There is anterior bladder  wall thickening which likely represents   secondary cystitis.   Indeterminate enhancing mass in the medial spleen measuring 2.5 cm in   size, probable hemangioma. Consider follow-up as clinically indicated.   NOTIFICATION:  The critical results of the study were discussed with,   and acknowledged by  Dr. Batsheva Brandon by telephone on April 26, 2024   at 1152 hours.   Signed by Mic Hernandez MD           Procedures  Procedures     EMERGENCY DEPARTMENT COURSE/MDM:   Medical Decision Making  Aruna De La Rosa is an 63 y.o. female with history including diet-controlled type 2 diabetes, hypertension, hyperlipidemia presenting to the emergency department for worsening abdominal pain suprapubic pain.  Given that patient has right lower quadrant pain and her age with no history of surgery cannot rule out appendicitis.  This could also be evidence of a kidney stone versus pyelonephritis secondary to her UTI.  Repeat urine CT imaging and labs ordered.    Lab workup reviewed.  WBC is 10.9 creatinine is 1.12.  This is improved from her baseline.  Urinalysis is pending lipase is negative.  CT imaging came back before the urinalysis did and it shows evidence of irritation to the gallbladder as well as acute appendicitis without an abscess or perforation.  Discussed these imaging results with Dr. Balbuena.  Dr. Balbuena believes that this is more chronic appendicitis and should be treated with IV antibiotics.  He states that patient does not require emergent surgery at this time.    Patient admitted to medicine for medical management of appendicitis requiring IV antibiotics.        ED Course as of 04/26/24 1339   Fri Apr 26, 2024   1155 Last PO intake at 8am water and banana [SK]      ED Course User Index  [SK] Batsheva Brandon DO         Diagnoses as of 04/26/24 1339   Acute appendicitis with localized peritonitis, without perforation, abscess, or gangrene        External records reviewed: recent inpatient, clinic, and prior ED notes  Labs  and Diagnostic imaging independently reviewed/interpreted by me.    Patient plan, care, lab results and imaging were all discussed with attending.    ED Medications administered this visit:    Medications   iohexol (OMNIPaque) 350 mg iodine/mL solution 75 mL (75 mL intravenous Given 4/26/24 1110)   piperacillin-tazobactam-dextrose (Zosyn) IV 3.375 g (0 g intravenous Stopped 4/26/24 1236)   HYDROmorphone (Dilaudid) injection 0.5 mg (0.5 mg intravenous Given 4/26/24 1207)     New Prescriptions from this visit:    New Prescriptions    No medications on file       (Please note that portions of this note were completed with a voice recognition program.  Efforts were made to edit the dictations but occasionally words are mis-transcribed.)     Batsheva Brandon DO  Resident  04/26/24 3940

## 2024-04-26 NOTE — CONSULTS
Aruna De La Rosa   [id number]   1960         Chief Complaint/        Possible appendicitis          HPI/    59-year-old female seen for possible appendicitis.    Patient states that she has been sick since last Wednesday.  This is 9 days ago not 48 hours ago.  She developed lower abdominal discomfort, loss of appetite and low-grade fever.  This persisted for several days and over the weekend.  She was seen by her family doctor on Monday 4/22.  Patient was thought to have a urinary tract infection.  She has been treated with oral ciprofloxacin    Since taking the ciprofloxacin patient says she is continue to have lower abdominal discomfort.  It is more right lower quadrant and suprapubic.  She says that she gets full easily.  Pain seems to be aggravated by eating.  She says she may have had a little bit of fever but no chills.    Patient says her urinary tract function is normal.  She specifically [place] hematuria dysuria.  Says her bowel movements are formed and without loose or gross blood    Patient was seen in the emergency room earlier today.  She was noted to right lower quadrant tenderness.  This led to a CT scan of the and pelvis which demonstrates appendicitis with possible inflammatory incorporation of the right ovary and terminal ileum into a low-grade phlegmon    Patient states she has never had any problems like this in the past he never had a colonoscopy     Past Medical History:   Diagnosis Date    Encounter for immunization 03/03/2021    Encounter for immunization    Encounter for screening for malignant neoplasm of colon 03/04/2020    Screen for colon cancer    Other conditions influencing health status 05/13/2021    History of cough    Other malaise     Malaise and fatigue    Other specified symptoms and signs involving the circulatory and respiratory systems 05/13/2021    Chest congestion    Personal history of other infectious and parasitic diseases 03/31/2022    History of candidiasis of vagina     Personal history of other medical treatment     History of screening mammography    Shortness of breath 05/07/2021    Increasing shortness of breath    Unspecified acute lower respiratory infection 05/13/2021    Lower resp. tract infection      Hypertension    Prior history of diabetes, is greatly improved with the weight loss, currently on no meds    Denies coronary artery disease or stroke     intermittent smoking     asthma    Denies abdominal surgery              Social History     Socioeconomic History    Marital status: Single     Spouse name: Not on file    Number of children: Not on file    Years of education: Not on file    Highest education level: Not on file   Occupational History    Not on file   Tobacco Use    Smoking status: Former     Current packs/day: 0.25     Types: Cigarettes    Smokeless tobacco: Never   Vaping Use    Vaping status: Never Used   Substance and Sexual Activity    Alcohol use: Never    Drug use: Yes     Types: Marijuana    Sexual activity: Not on file   Other Topics Concern    Not on file   Social History Narrative    Not on file     Social Determinants of Health     Financial Resource Strain: Not on file   Food Insecurity: Not on file   Transportation Needs: Not on file   Physical Activity: Not on file   Stress: Not on file   Social Connections: Not on file   Intimate Partner Violence: Not on file   Housing Stability: Not on file      Intermittently smokes    Family History   Family history unknown: Yes        Review of Systems   All other systems reviewed and are negative.       No current facility-administered medications for this encounter.    Current Outpatient Medications:     albuterol 90 mcg/actuation inhaler, Inhale 1 puff every 6 hours if needed for wheezing or shortness of breath., Disp: 18 g, Rfl: 1    ciprofloxacin (Cipro) 250 mg tablet, Take 1 tablet (250 mg) by mouth 2 times a day for 7 days., Disp: 14 tablet, Rfl: 0    lancets (Accu-Chek Softclix Lancets) misc, USE  TO TEST BLOOD SUGAR TWICE DAILY, Disp: 200 each, Rfl: 1    olmesartan (BENIcar) 20 mg tablet, Take 1 tablet (20 mg) by mouth once daily., Disp: 30 tablet, Rfl: 5    rosuvastatin (Crestor) 10 mg tablet, Take 1 tablet (10 mg) by mouth once daily., Disp: 30 tablet, Rfl: 5    Trelegy Ellipta 100-62.5-25 mcg blister with device, INHALE ONE PUFF BY MOUTH ONCE DAILY, Disp: 60 each, Rfl: 2    FLUoxetine (PROzac) 10 mg capsule, Take 1 capsule (10 mg) by mouth once daily. (Patient not taking: Reported on 4/26/2024), Disp: 30 capsule, Rfl: 5       no      Xaralto  no      Eliquis  no      Coumadin  no      Plavix        No Known Allergies     CT abdomen pelvis w IV contrast  Narrative: STUDY:  CT Abdomen and Pelvis with IV Contrast; 4/26/2024, 11:17 am  INDICATION:  Right lower quadrant and suprapubic pain  COMPARISON:  None Available.  ACCESSION NUMBER(S):  [id number]  ORDERING CLINICIAN:  YE FOX  TECHNIQUE:  CT of the abdomen and pelvis was performed.  Contiguous axial images  were obtained at 3 mm slice thickness through the abdomen and pelvis.   Coronal and sagittal reconstructions at 3 mm slice thickness were  performed.  Omnipaque 350, 75 mL was administered intravenously  FINDINGS:  LOWER CHEST:  No cardiomegaly.  No pericardial effusion.  Lung bases are clear.     ABDOMEN:     LIVER:  No hepatomegaly.  Smooth surface contour.  Normal attenuation.     BILE DUCTS:  No intrahepatic or extrahepatic biliary ductal dilatation.     GALLBLADDER:  The gallbladder is unremarkable.  STOMACH:  No abnormalities identified.     PANCREAS:  No masses or ductal dilatation.     SPLEEN:  No splenomegaly is observed. 2.6 cm enhancing mass noted in the medial  spleen.     ADRENAL GLANDS:  No thickening or nodules.     KIDNEYS AND URETERS:  Kidneys are normal in size and location.  No renal or ureteral  calculi.     PELVIS:     BLADDER:  There is anterior bladder wall thickening, likely from secondary  reactive inflammation.      REPRODUCTIVE ORGANS:  No abnormalities identified.     BOWEL:  Acutely inflamed appendix is observed, with mural thickening and  surrounding fat stranding. There is also secondary inflammatory change  involving the terminal ileum. Portions of the right adnexa are also  partially involved by inflammatory change. There is no abscess.     VESSELS:  No abnormalities identified.  Abdominal aorta is normal in caliber.  Scattered calcific plaques noted within the infrarenal aorta.     PERITONEUM/RETROPERITONEUM/LYMPH NODES:  No free fluid.  No pneumoperitoneum.  No lymphadenopathy.     ABDOMINAL WALL:  No abnormalities identified.  SOFT TISSUES:   No abnormalities identified.     BONES:  Geographic sclerotic lesions observed within the femoral heads  consistent with AVN. No suspicious bone lesions are seen. Hemangioma  observed within L3.  Impression: Findings consistent with ACUTE APPENDICITIS with surrounding  mesenteric fat stranding. No abscess.  Secondary inflammatory change noted involving the terminal ileum. Mild  free fluid in the pelvis.  There is anterior bladder wall thickening which likely represents  secondary cystitis.  Indeterminate enhancing mass in the medial spleen measuring 2.5 cm in  size, probable hemangioma. Consider follow-up as clinically indicated.  NOTIFICATION:  The critical results of the study were discussed with,  and acknowledged by  Dr. Batsheva Brandon by telephone on April 26, 2024  at 1152 hours.  Signed by Mic Hernandez MD       I have reviewed the images and the reports      Laboratory data:   CBC:   Lab Results   Component Value Date    WBC 10.9 04/26/2024    RBC 4.36 04/26/2024    HGB 13.0 04/26/2024    HCT 40.6 04/26/2024     04/26/2024   ]   [facility] Labs:   Lab Results   Component Value Date     04/26/2024    K 3.6 04/26/2024     04/26/2024    CO2 28 04/26/2024    BUN 13 04/26/2024    CREATININE 1.12 (H) 04/26/2024    CALCIUM 9.7 04/26/2024    PROT 7.6 04/26/2024     "ALBUMIN 4.4 04/26/2024    BILITOT 0.5 04/26/2024    ALKPHOS 89 04/26/2024    AST 16 04/26/2024    ALT 15 04/26/2024          I have reviewed the above laboratory studies      /86   Pulse 94   Temp 36.2 °C (97.2 °F) (Temporal)   Resp 18   Ht 1.702 m (5' 7\")   Wt 72.6 kg (160 lb)   SpO2 99%   BMI 25.06 kg/m²        Physical Exam  Constitutional:       Appearance: Normal appearance.   HENT:      Head: Normocephalic and atraumatic.   Cardiovascular:      Rate and Rhythm: Normal rate and regular rhythm.   Pulmonary:      Effort: Pulmonary effort is normal.      Breath sounds: Normal breath sounds.   Abdominal:      Comments: Right lower quadrant and suprapubic tenderness    Nondistended    No guarding or rebound    No hernias or masses   Musculoskeletal:      Cervical back: Normal range of motion.   Skin:     General: Skin is warm.   Neurological:      General: No focal deficit present.      Mental Status: She is alert and oriented to person, place, and time.                  Assessment/    Late presentation of appendicitis with phlegmon    Baseline health issues to include hypertension, low-grade diabetes, tobacco abuse        Plan/    By history patient said that for 9 days.  She is already been on for 5 days along antibiotics per primary care.  I do not think emergent surgery is needed    Patient would benefit from hospital admission, n.p.o. and fluids analgesia and antibiotics.  Hopefully she will  respond to nonoperative therapy.    I do not see an abscess that needs drainage.    Again I think the process is walled off and is a phlegmon.  Again this will be treated in a nonoperative fashion    "

## 2024-04-26 NOTE — CARE PLAN
The patient's goals for the shift include pain relief.    The clinical goals for the shift include patient will verbalize a decrease in abdominal pain by end of shift.

## 2024-04-26 NOTE — CARE PLAN
The patient's goals for the shift include Pain and gas relief.    The clinical goals for the shift include Patient will verbalize a decrease in abdominal pain by end of shift.    Patients pain has been managed this shift.

## 2024-04-27 VITALS
HEART RATE: 100 BPM | WEIGHT: 160 LBS | RESPIRATION RATE: 18 BRPM | SYSTOLIC BLOOD PRESSURE: 121 MMHG | DIASTOLIC BLOOD PRESSURE: 58 MMHG | HEIGHT: 67 IN | TEMPERATURE: 97.9 F | OXYGEN SATURATION: 95 % | BODY MASS INDEX: 25.11 KG/M2

## 2024-04-27 LAB
ALBUMIN SERPL BCP-MCNC: 3.6 G/DL (ref 3.4–5)
ALP SERPL-CCNC: 76 U/L (ref 33–136)
ALT SERPL W P-5'-P-CCNC: 12 U/L (ref 7–45)
ANION GAP SERPL CALC-SCNC: 13 MMOL/L (ref 10–20)
AST SERPL W P-5'-P-CCNC: 12 U/L (ref 9–39)
BILIRUB SERPL-MCNC: 0.4 MG/DL (ref 0–1.2)
BUN SERPL-MCNC: 9 MG/DL (ref 6–23)
CALCIUM SERPL-MCNC: 8.8 MG/DL (ref 8.6–10.3)
CHLORIDE SERPL-SCNC: 104 MMOL/L (ref 98–107)
CO2 SERPL-SCNC: 25 MMOL/L (ref 21–32)
CREAT SERPL-MCNC: 0.97 MG/DL (ref 0.5–1.05)
EGFRCR SERPLBLD CKD-EPI 2021: 65 ML/MIN/1.73M*2
ERYTHROCYTE [DISTWIDTH] IN BLOOD BY AUTOMATED COUNT: 11.9 % (ref 11.5–14.5)
GLUCOSE BLD MANUAL STRIP-MCNC: 120 MG/DL (ref 74–99)
GLUCOSE BLD MANUAL STRIP-MCNC: 137 MG/DL (ref 74–99)
GLUCOSE SERPL-MCNC: 115 MG/DL (ref 74–99)
HCT VFR BLD AUTO: 35.4 % (ref 36–46)
HGB BLD-MCNC: 11.3 G/DL (ref 12–16)
MAGNESIUM SERPL-MCNC: 1.73 MG/DL (ref 1.6–2.4)
MCH RBC QN AUTO: 29.9 PG (ref 26–34)
MCHC RBC AUTO-ENTMCNC: 31.9 G/DL (ref 32–36)
MCV RBC AUTO: 94 FL (ref 80–100)
NRBC BLD-RTO: 0 /100 WBCS (ref 0–0)
PLATELET # BLD AUTO: 219 X10*3/UL (ref 150–450)
POTASSIUM SERPL-SCNC: 3.7 MMOL/L (ref 3.5–5.3)
PROT SERPL-MCNC: 6.5 G/DL (ref 6.4–8.2)
RBC # BLD AUTO: 3.78 X10*6/UL (ref 4–5.2)
SODIUM SERPL-SCNC: 138 MMOL/L (ref 136–145)
WBC # BLD AUTO: 10.3 X10*3/UL (ref 4.4–11.3)

## 2024-04-27 PROCEDURE — 80053 COMPREHEN METABOLIC PANEL: CPT | Performed by: INTERNAL MEDICINE

## 2024-04-27 PROCEDURE — 96366 THER/PROPH/DIAG IV INF ADDON: CPT | Performed by: INTERNAL MEDICINE

## 2024-04-27 PROCEDURE — G0378 HOSPITAL OBSERVATION PER HR: HCPCS

## 2024-04-27 PROCEDURE — 85027 COMPLETE CBC AUTOMATED: CPT | Performed by: INTERNAL MEDICINE

## 2024-04-27 PROCEDURE — 99233 SBSQ HOSP IP/OBS HIGH 50: CPT | Performed by: SURGERY

## 2024-04-27 PROCEDURE — 99238 HOSP IP/OBS DSCHRG MGMT 30/<: CPT | Performed by: INTERNAL MEDICINE

## 2024-04-27 PROCEDURE — 36415 COLL VENOUS BLD VENIPUNCTURE: CPT | Performed by: INTERNAL MEDICINE

## 2024-04-27 PROCEDURE — 82947 ASSAY GLUCOSE BLOOD QUANT: CPT | Mod: 59

## 2024-04-27 PROCEDURE — 2500000004 HC RX 250 GENERAL PHARMACY W/ HCPCS (ALT 636 FOR OP/ED): Mod: JZ | Performed by: INTERNAL MEDICINE

## 2024-04-27 PROCEDURE — 96372 THER/PROPH/DIAG INJ SC/IM: CPT | Performed by: INTERNAL MEDICINE

## 2024-04-27 PROCEDURE — 2500000001 HC RX 250 WO HCPCS SELF ADMINISTERED DRUGS (ALT 637 FOR MEDICARE OP): Performed by: INTERNAL MEDICINE

## 2024-04-27 PROCEDURE — 83735 ASSAY OF MAGNESIUM: CPT | Performed by: INTERNAL MEDICINE

## 2024-04-27 RX ORDER — CEFTRIAXONE 1 G/50ML
1 INJECTION, SOLUTION INTRAVENOUS EVERY 24 HOURS
Status: DISCONTINUED | OUTPATIENT
Start: 2024-04-27 | End: 2024-04-27 | Stop reason: HOSPADM

## 2024-04-27 RX ORDER — CEFDINIR 300 MG/1
300 CAPSULE ORAL 2 TIMES DAILY
Qty: 20 CAPSULE | Refills: 0 | Status: SHIPPED | OUTPATIENT
Start: 2024-04-27 | End: 2024-05-07

## 2024-04-27 RX ORDER — METRONIDAZOLE 500 MG/1
500 TABLET ORAL 4 TIMES DAILY
Qty: 40 TABLET | Refills: 0 | Status: SHIPPED | OUTPATIENT
Start: 2024-04-27 | End: 2024-05-07

## 2024-04-27 RX ORDER — OXYCODONE HYDROCHLORIDE 5 MG/1
5 TABLET ORAL EVERY 6 HOURS PRN
Qty: 15 TABLET | Refills: 0 | Status: SHIPPED | OUTPATIENT
Start: 2024-04-27 | End: 2024-05-04

## 2024-04-27 RX ADMIN — OXYCODONE HYDROCHLORIDE 5 MG: 5 TABLET ORAL at 09:06

## 2024-04-27 RX ADMIN — CEFTRIAXONE SODIUM 1 G: 1 INJECTION, SOLUTION INTRAVENOUS at 14:57

## 2024-04-27 RX ADMIN — METRONIDAZOLE 500 MG: 500 INJECTION, SOLUTION INTRAVENOUS at 00:56

## 2024-04-27 RX ADMIN — ENOXAPARIN SODIUM 40 MG: 40 INJECTION SUBCUTANEOUS at 14:45

## 2024-04-27 RX ADMIN — METRONIDAZOLE 500 MG: 500 INJECTION, SOLUTION INTRAVENOUS at 09:03

## 2024-04-27 ASSESSMENT — PAIN SCALES - GENERAL: PAINLEVEL_OUTOF10: 6

## 2024-04-27 ASSESSMENT — PAIN SCALES - WONG BAKER: WONGBAKER_NUMERICALRESPONSE: HURTS EVEN MORE

## 2024-04-27 NOTE — CARE PLAN
The patient's goals for the shift include Pain and gas relief.    The clinical goals for the shift include pain control

## 2024-04-27 NOTE — DISCHARGE INSTRUCTIONS
"It was a pleasure to meet you in the hospital  You were diagnosed as having appendicitis, this has been going on for at least a week and was initially diagnosed as a urinary tract infection  Between taking antibiotics for the UTI and the amount of time, your body was able to wall off the infection in the appendix, this resulted in something called an appendiceal phlegmon  Your appendix will still need to be removed, although you will be treated with a course of antibiotics for several weeks and will need to follow-up with the surgeon who saw you here in the hospital, Dr. Balbuena  You were treated with a medication called ceftriaxone, and IV antibiotic for several days, as well as Flagyl, another antibiotic that covers \"anaerobic\" bacteria that do not require oxygen to replicate  You were given a prescription for pain medication for oxycodone, please take these as needed  A normal side effect of oxycodone is constipation, although we do not expect this to happen, if you have an issue with this in the coming days, please contact your physician's office for further instruction; the single dose of MiraLAX may be appropriate in that context  None of your other home medications have been changed  You should stick with a \"clear liquid diet\" for the rest of 4/27/2024, and then can advance to a \"full liquid diet\" 4/28/2024 as well as 4/29/2024  You should call on the morning of 4/29/2024 to set up a follow-up with Dr. Balbuena in 2 to 3 weeks, he will likely require surgery in 6 to 8 weeks  "

## 2024-04-27 NOTE — DISCHARGE SUMMARY
"Discharge Diagnosis  Acute appendicitis    Issues Requiring Follow-Up  It was a pleasure to meet you in the hospital  You were diagnosed as having appendicitis, this has been going on for at least a week and was initially diagnosed as a urinary tract infection  Between taking antibiotics for the UTI and the amount of time, your body was able to wall off the infection in the appendix, this resulted in something called an appendiceal phlegmon  Your appendix will still need to be removed, although you will be treated with a course of antibiotics for several weeks and will need to follow-up with the surgeon who saw you here in the hospital, Dr. Balbuena  You were treated with a medication called ceftriaxone, and IV antibiotic for several days, as well as Flagyl, another antibiotic that covers \"anaerobic\" bacteria that do not require oxygen to replicate  You were given a prescription for pain medication for oxycodone, please take these as needed  A normal side effect of oxycodone is constipation, although we do not expect this to happen, if you have an issue with this in the coming days, please contact your physician's office for further instruction; the single dose of MiraLAX may be appropriate in that context  None of your other home medications have been changed  You should stick with a \"clear liquid diet\" for the rest of 4/27/2024, and then can advance to a \"full liquid diet\" 4/28/2024 as well as 4/29/2024  You should call on the morning of 4/29/2024 to set up a follow-up with Dr. Balbuena in 2 to 3 weeks, he will likely require surgery in 6 to 8 weeks    Discharge Meds     Your medication list        START taking these medications        Instructions Last Dose Given Next Dose Due   cefdinir 300 mg capsule  Commonly known as: Omnicef      Take 1 capsule (300 mg) by mouth 2 times a day for 10 days.       metroNIDAZOLE 500 mg tablet  Commonly known as: Flagyl      Take 1 tablet (500 mg) by mouth 4 times a day for 10 " days.       oxyCODONE 5 mg immediate release tablet  Commonly known as: Roxicodone      Take 1 tablet (5 mg) by mouth every 6 hours if needed for moderate pain (4 - 6) or severe pain (7 - 10) for up to 7 days.              CONTINUE taking these medications        Instructions Last Dose Given Next Dose Due   albuterol 90 mcg/actuation inhaler      Inhale 1 puff every 6 hours if needed for wheezing or shortness of breath.       lancets misc  Commonly known as: Accu-Chek Softclix Lancets      USE TO TEST BLOOD SUGAR TWICE DAILY       olmesartan 20 mg tablet  Commonly known as: BENIcar      Take 1 tablet (20 mg) by mouth once daily.       rosuvastatin 10 mg tablet  Commonly known as: Crestor      Take 1 tablet (10 mg) by mouth once daily.       Trelegy Ellipta 100-62.5-25 mcg blister with device  Generic drug: fluticasone-umeclidin-vilanter      INHALE ONE PUFF BY MOUTH ONCE DAILY              STOP taking these medications      ciprofloxacin 250 mg tablet  Commonly known as: Cipro        FLUoxetine 10 mg capsule  Commonly known as: PROzac                  Where to Get Your Medications        These medications were sent to [facility] [address] [id number] [address] [address]      Phone: [phone]   cefdinir 300 mg capsule  metroNIDAZOLE 500 mg tablet  oxyCODONE 5 mg immediate release tablet         Test Results Pending At Discharge  Pending Labs       Order Current Status    Urine Culture In process            Hospital Course  Aruna De La Rosa is a 63 y.o. female presenting with abdominal pain.     Patient is noted to be an adequate historian.  She was seen on the 3 S. observation unit.  She notes that she was having approximately 10 days of symptoms, she spoke to her primary care physician after trying some at home remedies including an over-the-counter that she cannot recall the name of as well as drinking approximately a quart of cranberry juice for which she presumed was a  UTI, primary care physician gave a course of ciprofloxacin, the patient notes that some of the symptoms did improve, although she continued to have some lower abdominal discomfort that was fairly consistent.  There was a cramping nature to it and she thought she might have some associated gas from the antibiotics and from an underlying UTI, although she denied having any fevers or chills.  Her symptoms had improved and then slowly started to get worse, she ultimately decided after today that she should come in and get an evaluation in the emergency department after touching base with her primary care physician as well as to facilitate imaging.  She notes that she has not had any headache, fevers or chills, she has had decreased appetite and the ongoing abdominal pain, she had some nausea, although no vomiting, she otherwise denied having any chest pain, cough, peripheral edema, rash.     On arrival to the emergency department she was afebrile, heart rate 94, respiratory rate 16, blood pressure 147/66, SpO2 99% on ambient air; labs did not reveal a leukocytosis although showed a left shift, creatinine 1.12 with otherwise normal electrolytes, there was trace leuk esterase on the urinalysis; CT scan of the abdomen and pelvis showed findings consistent with acute appendicitis due to surrounding mesenteric fat stranding although without notable abscess; as documented by the surgery consultant there is likely a phlegmon that is walled off; there was also secondary inflammatory changes involving the terminal ileum associated with some mild free fluid in the pelvis as well as an incidental finding of a 2.5 cm lesion in the spleen consistent with a probable hemangioma; surgery service was consulted and recommended the patient come to the hospitalist service for observation overnight, she was admitted to the general medicine team for continued IV antibiotics and to advance her diet as tolerated.    Patient was seen in  conjunction with surgery, she was able to tolerate clear liquids as well as a banana, her analgesia was achieved by p.o. oxycodone, she was treated with ceftriaxone on 4/26 as well as 4/27/2024 as well as Flagyl, she will be discharged to finish 10 additional days of cefdinir and Flagyl p.o. as she was tolerating diet, she will follow-up with surgery in 2 to 3 weeks and plan for appendectomy in 6 to 8 weeks time; she was given the patient discharge instructions above about propagating a clear liquid diet for the remainder of the discharge day evening and advancing that to full liquids on the subsequent days and ramping up as tolerated subsequently.  Patient expressed understanding and was given printed out information as noted above.    Giles Musa MD  [facility] [place]  Internal Medicine    This document was generated in whole or in part using the Dragon One medical voice recognition software and there may be some incorrect words/wording, spelling, or punctuation errors that were not corrected prior to finalization in the medical record.    Pertinent Physical Exam At Time of Discharge  Physical Exam  Vitals reviewed.   Constitutional:       General: She is not in acute distress.     Appearance: Normal appearance. She is not ill-appearing.   HENT:      Head: Normocephalic and atraumatic.      Nose: Nose normal.      Mouth/Throat:      Mouth: Mucous membranes are moist.   Eyes:      General: No scleral icterus.  Cardiovascular:      Rate and Rhythm: Normal rate and regular rhythm.      Pulses: Normal pulses.      Heart sounds: Normal heart sounds. No murmur heard.     No friction rub. No gallop.   Pulmonary:      Effort: Pulmonary effort is normal. No respiratory distress.      Breath sounds: Normal breath sounds. No wheezing, rhonchi or rales.   Abdominal:      General: Abdomen is flat. Bowel sounds are normal. There is no distension.      Palpations: Abdomen is soft. There is no mass.      Tenderness:  There is abdominal tenderness that has improved significantly. There is no guarding or rebound.   Musculoskeletal:         General: No swelling, tenderness or signs of injury.      Right lower leg: No edema.      Left lower leg: No edema.   Skin:     General: Skin is warm and dry.      Coloration: Skin is not jaundiced or pale.      Findings: No bruising, erythema, lesion or rash.   Neurological:      Mental Status: She is alert and oriented to person, place, and time. Mental status is at baseline.      Motor: No weakness.   Psychiatric:         Mood and Affect: Mood normal.         Behavior: Behavior normal.         Thought Content: Thought content normal.         Judgment: Judgment normal.     Outpatient Follow-Up  No future appointments.      Giles Musa MD

## 2024-04-27 NOTE — PROGRESS NOTES
"Aruna De La Rosa  [id number]   1960       Subjective/          Seen in follow-up of late presentation of appendicitis    Patient says she is still hurting.  She says she feels better than yesterday but she still says her pain is a 7 out of 10    Given clear liquids which she is tolerating    Denies fever or chills              Heart Rate:  []   Temp:  [35.7 °C (96.3 °F)-37.2 °C (99 °F)]   Resp:  [16-19]   BP: (113-159)/(52-86)   Height:  [170.2 cm (5' 7\")]   Weight:  [72.6 kg (160 lb)]   SpO2:  [94 %-99 %]     Intake/Output Summary (Last 24 hours) at 4/27/2024 0947  Last data filed at 4/26/2024 1851  Gross per 24 hour   Intake 770 ml   Output --   Net 770 ml          Physical Exam  Constitutional:       Appearance: Normal appearance.   HENT:      Head: Normocephalic and atraumatic.   Cardiovascular:      Rate and Rhythm: Normal rate and regular rhythm.   Pulmonary:      Effort: Pulmonary effort is normal.      Breath sounds: Normal breath sounds.   Abdominal:      Comments: Remains tender in the right lower quadrant, less so than yesterday    No guarding or rebound   Musculoskeletal:      Cervical back: Normal range of motion.   Skin:     General: Skin is warm.   Neurological:      General: No focal deficit present.      Mental Status: She is alert and oriented to person, place, and time.                Results for orders placed or performed during the hospital encounter of 04/26/24 (from the past 24 hour(s))   CBC and Auto Differential   Result Value Ref Range    WBC 10.9 4.4 - 11.3 x10*3/uL    nRBC 0.0 0.0 - 0.0 /100 WBCs    RBC 4.36 4.00 - 5.20 x10*6/uL    Hemoglobin 13.0 12.0 - 16.0 g/dL    Hematocrit 40.6 36.0 - 46.0 %    MCV 93 80 - 100 fL    MCH 29.8 26.0 - 34.0 pg    MCHC 32.0 32.0 - 36.0 g/dL    RDW 11.9 11.5 - 14.5 %    Platelets 237 150 - 450 x10*3/uL    Neutrophils % 77.5 40.0 - 80.0 %    Immature Granulocytes %, Automated 0.4 0.0 - 0.9 %    Lymphocytes % 10.3 13.0 - 44.0 %    Monocytes % 9.8 2.0 " - 10.0 %    Eosinophils % 1.5 0.0 - 6.0 %    Basophils % 0.5 0.0 - 2.0 %    Neutrophils Absolute 8.48 (H) 1.20 - 7.70 x10*3/uL    Immature Granulocytes Absolute, Automated 0.04 0.00 - 0.70 x10*3/uL    Lymphocytes Absolute 1.13 (L) 1.20 - 4.80 x10*3/uL    Monocytes Absolute 1.07 (H) 0.10 - 1.00 x10*3/uL    Eosinophils Absolute 0.16 0.00 - 0.70 x10*3/uL    Basophils Absolute 0.05 0.00 - 0.10 x10*3/uL   Comprehensive metabolic panel   Result Value Ref Range    Glucose 126 (H) 74 - 99 mg/dL    Sodium 138 136 - 145 mmol/L    Potassium 3.6 3.5 - 5.3 mmol/L    Chloride 102 98 - 107 mmol/L    Bicarbonate 28 21 - 32 mmol/L    Anion Gap 12 10 - 20 mmol/L    Urea Nitrogen 13 6 - 23 mg/dL    Creatinine 1.12 (H) 0.50 - 1.05 mg/dL    eGFR 55 (L) >60 mL/min/1.73m*2    Calcium 9.7 8.6 - 10.3 mg/dL    Albumin 4.4 3.4 - 5.0 g/dL    Alkaline Phosphatase 89 33 - 136 U/L    Total Protein 7.6 6.4 - 8.2 g/dL    AST 16 9 - 39 U/L    Bilirubin, Total 0.5 0.0 - 1.2 mg/dL    ALT 15 7 - 45 U/L   Lipase   Result Value Ref Range    Lipase 24 9 - 82 U/L   PST Top   Result Value Ref Range    Extra Tube Hold for add-ons.    Urinalysis with Reflex Culture and Microscopic   Result Value Ref Range    Color, Urine Yellow Straw, Yellow    Appearance, Urine Clear Clear    Specific Gravity, Urine 1.039 (N) 1.005 - 1.035    pH, Urine 6.0 5.0, 5.5, 6.0, 6.5, 7.0, 7.5, 8.0    Protein, Urine NEGATIVE NEGATIVE mg/dL    Glucose, Urine NEGATIVE NEGATIVE mg/dL    Blood, Urine NEGATIVE NEGATIVE    Ketones, Urine NEGATIVE NEGATIVE mg/dL    Bilirubin, Urine NEGATIVE NEGATIVE    Urobilinogen, Urine <2.0 <2.0 mg/dL    Nitrite, Urine NEGATIVE NEGATIVE    Leukocyte Esterase, Urine TRACE (A) NEGATIVE   Extra Urine Gray Tube   Result Value Ref Range    Extra Tube Hold for add-ons.    Microscopic Only, Urine   Result Value Ref Range    WBC, Urine 1-5 1-5, NONE /HPF    RBC, Urine NONE NONE, 1-2, 3-5 /HPF    Squamous Epithelial Cells, Urine 1-9 (SPARSE) Reference range not  established. /HPF   POCT GLUCOSE   Result Value Ref Range    POCT Glucose 87 74 - 99 mg/dL   POCT GLUCOSE   Result Value Ref Range    POCT Glucose 231 (H) 74 - 99 mg/dL   CBC   Result Value Ref Range    WBC 10.3 4.4 - 11.3 x10*3/uL    nRBC 0.0 0.0 - 0.0 /100 WBCs    RBC 3.78 (L) 4.00 - 5.20 x10*6/uL    Hemoglobin 11.3 (L) 12.0 - 16.0 g/dL    Hematocrit 35.4 (L) 36.0 - 46.0 %    MCV 94 80 - 100 fL    MCH 29.9 26.0 - 34.0 pg    MCHC 31.9 (L) 32.0 - 36.0 g/dL    RDW 11.9 11.5 - 14.5 %    Platelets 219 150 - 450 x10*3/uL   Magnesium   Result Value Ref Range    Magnesium 1.73 1.60 - 2.40 mg/dL   Comprehensive Metabolic Panel   Result Value Ref Range    Glucose 115 (H) 74 - 99 mg/dL    Sodium 138 136 - 145 mmol/L    Potassium 3.7 3.5 - 5.3 mmol/L    Chloride 104 98 - 107 mmol/L    Bicarbonate 25 21 - 32 mmol/L    Anion Gap 13 10 - 20 mmol/L    Urea Nitrogen 9 6 - 23 mg/dL    Creatinine 0.97 0.50 - 1.05 mg/dL    eGFR 65 >60 mL/min/1.73m*2    Calcium 8.8 8.6 - 10.3 mg/dL    Albumin 3.6 3.4 - 5.0 g/dL    Alkaline Phosphatase 76 33 - 136 U/L    Total Protein 6.5 6.4 - 8.2 g/dL    AST 12 9 - 39 U/L    Bilirubin, Total 0.4 0.0 - 1.2 mg/dL    ALT 12 7 - 45 U/L   POCT GLUCOSE   Result Value Ref Range    POCT Glucose 120 (H) 74 - 99 mg/dL        I reviewed the above studies      CT abdomen pelvis w IV contrast    Result Date: 4/26/2024  STUDY: CT Abdomen and Pelvis with IV Contrast; 4/26/2024, 11:17 am INDICATION: Right lower quadrant and suprapubic pain COMPARISON: None Available. ACCESSION NUMBER(S): [id number] ORDERING CLINICIAN: YE FOX TECHNIQUE: CT of the abdomen and pelvis was performed.  Contiguous axial images were obtained at 3 mm slice thickness through the abdomen and pelvis. Coronal and sagittal reconstructions at 3 mm slice thickness were performed.  Omnipaque 350, 75 mL was administered intravenously FINDINGS: LOWER CHEST: No cardiomegaly.  No pericardial effusion.  Lung bases are clear.  ABDOMEN:  LIVER: No  hepatomegaly.  Smooth surface contour.  Normal attenuation.  BILE DUCTS: No intrahepatic or extrahepatic biliary ductal dilatation.  GALLBLADDER: The gallbladder is unremarkable. STOMACH: No abnormalities identified.  PANCREAS: No masses or ductal dilatation.  SPLEEN: No splenomegaly is observed. 2.6 cm enhancing mass noted in the medial spleen.  ADRENAL GLANDS: No thickening or nodules.  KIDNEYS AND URETERS: Kidneys are normal in size and location.  No renal or ureteral calculi.  PELVIS:  BLADDER: There is anterior bladder wall thickening, likely from secondary reactive inflammation.  REPRODUCTIVE ORGANS: No abnormalities identified.  BOWEL: Acutely inflamed appendix is observed, with mural thickening and surrounding fat stranding. There is also secondary inflammatory change involving the terminal ileum. Portions of the right adnexa are also partially involved by inflammatory change. There is no abscess.  VESSELS: No abnormalities identified.  Abdominal aorta is normal in caliber. Scattered calcific plaques noted within the infrarenal aorta.  PERITONEUM/RETROPERITONEUM/LYMPH NODES: No free fluid.  No pneumoperitoneum. No lymphadenopathy.  ABDOMINAL WALL: No abnormalities identified. SOFT TISSUES: No abnormalities identified.  BONES: Geographic sclerotic lesions observed within the femoral heads consistent with AVN. No suspicious bone lesions are seen. Hemangioma observed within L3.    Findings consistent with ACUTE APPENDICITIS with surrounding mesenteric fat stranding. No abscess. Secondary inflammatory change noted involving the terminal ileum. Mild free fluid in the pelvis. There is anterior bladder wall thickening which likely represents secondary cystitis. Indeterminate enhancing mass in the medial spleen measuring 2.5 cm in size, probable hemangioma. Consider follow-up as clinically indicated. NOTIFICATION:  The critical results of the study were discussed with, and acknowledged by  Dr. Batsheva Brandon by  telephone on April 26, 2024 at 1152 hours. Signed by Mic Hernandez MD       I have reviewed the images and the reports        Assessment/    Late presentation of appendicitis    Improved compared to yesterday      Plan/        Recommend keeping as inpatient.  Continue IV antibiotics.    I would not advance diet beyond clear liquids      Rishabh Balbuena MD

## 2024-04-28 LAB — BACTERIA UR CULT: NO GROWTH

## 2024-04-29 ENCOUNTER — PATIENT OUTREACH (OUTPATIENT)
Dept: CARE COORDINATION | Facility: CLINIC | Age: 64
End: 2024-04-29
Payer: COMMERCIAL

## 2024-04-29 DIAGNOSIS — E11.9 TYPE 2 DIABETES MELLITUS WITHOUT COMPLICATION, UNSPECIFIED WHETHER LONG TERM INSULIN USE (MULTI): ICD-10-CM

## 2024-04-29 RX ORDER — IBUPROFEN 200 MG
CAPSULE ORAL
Qty: 200 STRIP | Refills: 1 | Status: SHIPPED | OUTPATIENT
Start: 2024-04-29

## 2024-04-29 NOTE — TELEPHONE ENCOUNTER
Patient called the office stated she was released from the hospital.    Patient was dx with acute appendicitis.    She has an upcoming apt on 5/1/2024. She is requesting an antifungal for a yeast infection.    [facility] in [place]

## 2024-04-29 NOTE — PROGRESS NOTES
Discharge Facility: [facility]  Discharge Diagnosis: Acute appendicitis  Admission Date: 4/26/2024  Discharge Date: 4/27/2024    PCP Appointment Date: Dr Soriano 5/1/2024  Specialist Appointment Date: Dr Sree SIMPSON  Hospital Encounter and Summary: Linked     Pt has PCP follow up within 2 days business days of discharge

## 2024-04-30 NOTE — PROGRESS NOTES
Subjective   Patient ID: Aruna De La Rosa is a 63 y.o. female who presents for Hospital Follow-up.    HPI    Patient presents today for a hospital follow-up/KIRBY visit.    Discharged from: [facility]  Reason for visit: Acute appendicitis  Date of admission: 4-26-24  Date of discharge: 4-27-24  TCM call made within 2 days: Yes  Number of days since discharge: 4  Had blood work, and CT of the abdomen done while there.  Was prescribed cefdinir, flagyl, Oxycodone     States since being out pt is on liquid diet slowly weaning her self to regular food.  She does have a follow up with the surgeon on Tuesday.   She still has tenderness.    She admits that she does have diarrhea.   She is not currently taking probiotic.   Denies any bloody stools.     Has no other new problem /question.    Review of systems  ; Patient seen today for exam denies any problems with headaches or vision, denies any shortness of breath chest pain nausea or vomiting, no black stool no blood in the stool no heartburn type symptoms denies any problems with constipation or diarrhea, and no dysuria-type symptoms    The patient's allergies medications were reviewed with them today    The patient's social family and surgical history or also reviewed here today, along with her past medical history.     Objective     Alert and active in  no acute distress  HEENT TMs clear oropharynx negative nares clear no drainage noted neck supple  With no adenopathy   Heart regular rate and rhythm without murmur and no carotid bruits  Lungs- clear to auscultation bilaterally, no wheeze or rhonchi noted  Thyroid -negative masses or nodularity  Abdomen- soft times four quadrants , bowel sounds positive no masses or organomegaly, slight tenderness in the right lower quadrant tenderness   no guarding or rebound  Neurological exam unremarkable- DTRs in upper and lower extremities within normal limits.   skin -no lesions noted      /58 (BP Location: Left arm, Patient  "Position: Sitting, BP Cuff Size: Adult)   Pulse 66   Temp 35.9 °C (96.7 °F) (Temporal)   Resp 16   Ht 1.702 m (5' 7\")   Wt 73.5 kg (162 lb)   SpO2 96%   BMI 25.37 kg/m²     No Known Allergies    Assessment/Plan   Problem List Items Addressed This Visit       Anxiety disorder    HTN (hypertension)    Type 2 diabetes mellitus without complication (Multi)    Acute appendicitis - Primary    BMI 25.0-25.9,adult    Hospital discharge follow-up     Reviewed blood work, and CT of the abdomen.    Continue to follow up with surgeon, Dr. Balbuena, as scheduled.     Recommend taking Activia.   Recommend less lactose products.     If anything worsens or changes please call us at once, follow up in the office as planned.    Scribe Attestation  By signing my name below, I, Katrina Porter MA, Scribe   attest that this documentation has been prepared under the direction and in the presence of Rajiv Soriano DO.      "

## 2024-05-01 ENCOUNTER — OFFICE VISIT (OUTPATIENT)
Dept: PRIMARY CARE | Facility: CLINIC | Age: 64
End: 2024-05-01
Payer: COMMERCIAL

## 2024-05-01 VITALS
HEART RATE: 66 BPM | TEMPERATURE: 96.7 F | RESPIRATION RATE: 16 BRPM | OXYGEN SATURATION: 96 % | WEIGHT: 162 LBS | BODY MASS INDEX: 25.43 KG/M2 | HEIGHT: 67 IN | DIASTOLIC BLOOD PRESSURE: 58 MMHG | SYSTOLIC BLOOD PRESSURE: 118 MMHG

## 2024-05-01 DIAGNOSIS — E11.9 TYPE 2 DIABETES MELLITUS WITHOUT COMPLICATION, UNSPECIFIED WHETHER LONG TERM INSULIN USE (MULTI): ICD-10-CM

## 2024-05-01 DIAGNOSIS — Z09 HOSPITAL DISCHARGE FOLLOW-UP: ICD-10-CM

## 2024-05-01 DIAGNOSIS — K35.80 ACUTE APPENDICITIS, UNSPECIFIED ACUTE APPENDICITIS TYPE: Primary | ICD-10-CM

## 2024-05-01 DIAGNOSIS — F41.9 ANXIETY DISORDER, UNSPECIFIED TYPE: ICD-10-CM

## 2024-05-01 DIAGNOSIS — I10 PRIMARY HYPERTENSION: ICD-10-CM

## 2024-05-01 PROCEDURE — 3074F SYST BP LT 130 MM HG: CPT | Performed by: FAMILY MEDICINE

## 2024-05-01 PROCEDURE — 3008F BODY MASS INDEX DOCD: CPT | Performed by: FAMILY MEDICINE

## 2024-05-01 PROCEDURE — 3078F DIAST BP <80 MM HG: CPT | Performed by: FAMILY MEDICINE

## 2024-05-01 PROCEDURE — 4010F ACE/ARB THERAPY RXD/TAKEN: CPT | Performed by: FAMILY MEDICINE

## 2024-05-01 PROCEDURE — 99496 TRANSJ CARE MGMT HIGH F2F 7D: CPT | Performed by: FAMILY MEDICINE

## 2024-05-07 ENCOUNTER — PATIENT OUTREACH (OUTPATIENT)
Dept: CARE COORDINATION | Facility: CLINIC | Age: 64
End: 2024-05-07

## 2024-05-07 ENCOUNTER — OFFICE VISIT (OUTPATIENT)
Dept: SURGERY | Facility: CLINIC | Age: 64
End: 2024-05-07
Payer: COMMERCIAL

## 2024-05-07 VITALS
BODY MASS INDEX: 24.74 KG/M2 | HEIGHT: 67 IN | RESPIRATION RATE: 16 BRPM | WEIGHT: 157.6 LBS | OXYGEN SATURATION: 99 % | TEMPERATURE: 97.5 F | SYSTOLIC BLOOD PRESSURE: 123 MMHG | HEART RATE: 70 BPM | DIASTOLIC BLOOD PRESSURE: 77 MMHG

## 2024-05-07 DIAGNOSIS — Z12.11 COLON CANCER SCREENING: ICD-10-CM

## 2024-05-07 DIAGNOSIS — K36 OTHER APPENDICITIS: Primary | ICD-10-CM

## 2024-05-07 PROCEDURE — 99213 OFFICE O/P EST LOW 20 MIN: CPT | Performed by: SURGERY

## 2024-05-07 PROCEDURE — 3078F DIAST BP <80 MM HG: CPT | Performed by: SURGERY

## 2024-05-07 PROCEDURE — 4010F ACE/ARB THERAPY RXD/TAKEN: CPT | Performed by: SURGERY

## 2024-05-07 PROCEDURE — 3074F SYST BP LT 130 MM HG: CPT | Performed by: SURGERY

## 2024-05-07 PROCEDURE — 3008F BODY MASS INDEX DOCD: CPT | Performed by: SURGERY

## 2024-05-07 NOTE — PROGRESS NOTES
Chief complaint/    Hospital follow-up        HPI/    63-year-old female recently hospitalized for appendicitis.  At time of presentation patient been sick for about 8-10 days.  CT demonstrated appendicitis and phlegmon.  Patient kept inpatient and treated with IV antibiotics.  Discharged home on oral antibiotics    Patient currently gaining strength.  Denies any fever or chills.  She notes a little bit of discomfort but she is significantly improved.  Ambulates easily.  GI and  function substantially better        Physical exam/    Minimal right suprapubic tenderness        Assessment/    Late presentation of appendicitis        Plan/    Complete the course of oral antibiotics    RTC 3 to 4 weeks for another check    Will repeat CT at that time.    Patient is never had a colonoscopy.  Patient would benefit for this once the inflammatory process has resolved    Any decision regarding interval appendectomy be deferred until after the CT and colonoscopy have been completed

## 2024-05-07 NOTE — PROGRESS NOTES
Call regarding appt. with PCP on 5/1/2024 after hospitalization.  At time of outreach call the patient feels as if their condition has slowly improved since last visit.  Reviewed the PCP appointment with the pt and addressed any questions or concerns. Pt has also had a follow up with Dr Balbuena Gen Surg. No new medications and further CT scan coming in a couple weeks.

## 2024-05-08 RX ORDER — SODIUM, POTASSIUM,MAG SULFATES 17.5-3.13G
1 SOLUTION, RECONSTITUTED, ORAL ORAL SEE ADMIN INSTRUCTIONS
Qty: 2 EACH | Refills: 0 | Status: SHIPPED | OUTPATIENT
Start: 2024-05-08

## 2024-05-10 DIAGNOSIS — J45.909 ASTHMA, UNSPECIFIED ASTHMA SEVERITY, UNSPECIFIED WHETHER COMPLICATED, UNSPECIFIED WHETHER PERSISTENT (HHS-HCC): ICD-10-CM

## 2024-05-10 RX ORDER — ALBUTEROL SULFATE 90 UG/1
AEROSOL, METERED RESPIRATORY (INHALATION)
Qty: 8.5 G | Refills: 5 | Status: SHIPPED | OUTPATIENT
Start: 2024-05-10

## 2024-05-13 DIAGNOSIS — E11.9 TYPE 2 DIABETES MELLITUS WITHOUT COMPLICATION, UNSPECIFIED WHETHER LONG TERM INSULIN USE (MULTI): ICD-10-CM

## 2024-05-13 DIAGNOSIS — J45.901 EXACERBATION OF ASTHMA, UNSPECIFIED ASTHMA SEVERITY, UNSPECIFIED WHETHER PERSISTENT (HHS-HCC): Primary | ICD-10-CM

## 2024-05-13 DIAGNOSIS — J45.909 ASTHMA, UNSPECIFIED ASTHMA SEVERITY, UNSPECIFIED WHETHER COMPLICATED, UNSPECIFIED WHETHER PERSISTENT (HHS-HCC): ICD-10-CM

## 2024-05-17 ENCOUNTER — HOSPITAL ENCOUNTER (OUTPATIENT)
Dept: RADIOLOGY | Facility: HOSPITAL | Age: 64
Discharge: HOME | End: 2024-05-17
Payer: COMMERCIAL

## 2024-05-17 DIAGNOSIS — K36 OTHER APPENDICITIS: ICD-10-CM

## 2024-05-17 PROCEDURE — 2550000001 HC RX 255 CONTRASTS: Performed by: SURGERY

## 2024-05-17 PROCEDURE — 74177 CT ABD & PELVIS W/CONTRAST: CPT | Performed by: RADIOLOGY

## 2024-05-17 PROCEDURE — 74177 CT ABD & PELVIS W/CONTRAST: CPT

## 2024-05-17 RX ADMIN — IOHEXOL 75 ML: 350 INJECTION, SOLUTION INTRAVENOUS at 11:30

## 2024-05-23 ENCOUNTER — OFFICE VISIT (OUTPATIENT)
Dept: SURGERY | Facility: CLINIC | Age: 64
End: 2024-05-23
Payer: COMMERCIAL

## 2024-05-23 VITALS
BODY MASS INDEX: 24.07 KG/M2 | HEART RATE: 77 BPM | SYSTOLIC BLOOD PRESSURE: 126 MMHG | RESPIRATION RATE: 16 BRPM | DIASTOLIC BLOOD PRESSURE: 82 MMHG | OXYGEN SATURATION: 98 % | HEIGHT: 68 IN | TEMPERATURE: 97.8 F | WEIGHT: 158.8 LBS

## 2024-05-23 DIAGNOSIS — K36 OTHER APPENDICITIS: Primary | ICD-10-CM

## 2024-05-23 PROCEDURE — 4010F ACE/ARB THERAPY RXD/TAKEN: CPT | Performed by: SURGERY

## 2024-05-23 PROCEDURE — 3074F SYST BP LT 130 MM HG: CPT | Performed by: SURGERY

## 2024-05-23 PROCEDURE — 3079F DIAST BP 80-89 MM HG: CPT | Performed by: SURGERY

## 2024-05-23 PROCEDURE — 99213 OFFICE O/P EST LOW 20 MIN: CPT | Performed by: SURGERY

## 2024-05-23 PROCEDURE — 3008F BODY MASS INDEX DOCD: CPT | Performed by: SURGERY

## 2024-05-23 NOTE — PROGRESS NOTES
Aruna De La Rosa   [id number]   1960       Chief Complaint/    Follow-up episode of appendicitis      HPI/      63-year-old female returns in follow-up.  She was hospitalized for appendicitis.  Patient came in approximately 7-10 days into the event.  She was thought to have appendiceal phlegmon and was treated with antibiotics and recovered.    Patient returns now in follow-up.  She says she has regained her preoperative state of health    Eating normally no weight loss no change in bowel habits    Patient scheduled for colonoscopy with the GI team later in June    Repeat CT dated 5/17/2024 demonstrates resolution of the appendicitis        Past Medical History:   Diagnosis Date    Encounter for immunization 03/03/2021    Encounter for immunization    Encounter for screening for malignant neoplasm of colon 03/04/2020    Screen for colon cancer    Other conditions influencing health status 05/13/2021    History of cough    Other malaise     Malaise and fatigue    Other specified symptoms and signs involving the circulatory and respiratory systems 05/13/2021    Chest congestion    Personal history of other infectious and parasitic diseases 03/31/2022    History of candidiasis of vagina    Personal history of other medical treatment     History of screening mammography    Shortness of breath 05/07/2021    Increasing shortness of breath    Unspecified acute lower respiratory infection 05/13/2021    Lower resp. tract infection          Current Outpatient Medications:     albuterol 90 mcg/actuation inhaler, INHALE 1 PUFF BY MOUTH EVERY 6 HOURS IF NEEDED FOR WHEEZING OR SHORTNESS OF BREATH, Disp: 8.5 g, Rfl: 5    blood sugar diagnostic (Blood Glucose Test) strip, Use to test glucose BID, Disp: 200 strip, Rfl: 1    lancets (Accu-Chek Softclix Lancets) misc, USE TO TEST BLOOD SUGAR TWICE DAILY, Disp: 200 each, Rfl: 1    olmesartan (BENIcar) 20 mg tablet, Take 1 tablet (20 mg) by mouth once daily., Disp: 30 tablet, Rfl: 5     rosuvastatin (Crestor) 10 mg tablet, Take 1 tablet (10 mg) by mouth once daily., Disp: 30 tablet, Rfl: 5    sodium,potassium,mag sulfates (Suprep) 17.5-3.13-1.6 gram recon soln solution, Take 2 bottles by mouth see administration instructions., Disp: 2 each, Rfl: 0    Trelegy Ellipta 100-62.5-25 mcg blister with device, INHALE ONE PUFF BY MOUTH ONCE DAILY, Disp: 60 each, Rfl: 2     No Known Allergies     [unfilled]     CT abdomen pelvis w IV contrast  Narrative: Interpreted By:  Nguyễn Keith and Marshall Colin   STUDY:  CT ABDOMEN PELVIS W IV CONTRAST;  5/17/2024 11:29 am      INDICATION:  Signs/Symptoms:RLQ pain, follow up appendicitis.      COMPARISON:  CT abdomen/pelvis dated 04/26/2024 and 05/27/2018.      ACCESSION NUMBER(S):  [id number]      ORDERING CLINICIAN:  AJ BUSH      TECHNIQUE:  CT of the abdomen and pelvis was performed.  Standard contiguous  axial images were obtained at 3 mm slice thickness through the  abdomen and pelvis. Coronal and sagittal reconstructions at 3 mm  slice thickness were performed.      75 ml of contrast Omnipaque 350 were administered intravenously  without immediate complication.      FINDINGS:  LOWER CHEST:  The partially visualized lower lungs are unremarkable. The heart is  normal in size without significant pericardial effusion. The distal  esophagus is unremarkable.      ABDOMEN:      LIVER:  The liver is normal in size without evidence of focal liver lesions.      BILE DUCTS:  The intrahepatic and extrahepatic ducts are not dilated.      GALLBLADDER:  The gallbladder is nondistended and without evidence of radiopaque  stones.      PANCREAS:  The pancreas appears unremarkable without evidence of ductal  dilatation or masses.      SPLEEN:  The spleen is normal in size. Stable size of a hyperenhancing lesion  within the posterior aspect of the spleen measuring 2.5 cm which was  seen as a hypodense lesion on noncontrast examination dated  05/27/2018, most  compatible with a hemangioma.      ADRENAL GLANDS:  Bilateral adrenal glands appear normal.      KIDNEYS AND URETERS:  The kidneys are normal in size and enhance symmetrically.  No  hydroureteronephrosis or nephroureterolithiasis is identified.      PELVIS:      BLADDER:  There is mild circumferential urinary bladder wall thickening which  may be secondary to underdistention.      REPRODUCTIVE ORGANS:  The uterus and bilateral adnexa are unremarkable.      BOWEL:  The stomach is unremarkable. Interval resolution of previously  visualized appendiceal dilatation with surrounding inflammatory  changes seen on prior exam dated 04/26/2024. There is a residual 0.9  cm encapsulated macroscopic fat containing focus within the right  anterior pelvis most compatible with an area of fat necrosis (series  201, image 121). No inflammatory bowel wall thickening or dilatation  on current exam.      VESSELS:  There is no aneurysmal dilatation of the abdominal aorta. The IVC  appears normal. Moderate atherosclerotic calcifications of the  abdominal aorta and its branching vessels are noted.      PERITONEUM/RETROPERITONEUM/LYMPH NODES:  There is no free or loculated fluid collection, no free  intraperitoneal air. The retroperitoneum appears normal.  No  abdominopelvic lymphadenopathy by CT size criteria.      BONES AND ABDOMINAL WALL:  No acute osseous abnormalities or suspicious osseous lesions.  Multilevel discogenic degenerative changes are again noted throughout  the lower thoracic and lumbar spine with intervertebral disc space  narrowing and vertebral body osteophytosis, similar to prior exam.  The abdominal wall soft tissues appear normal.      Impression: 1.  Interval resolution of previously visualized appendiceal  dilatation with surrounding inflammatory changes seen on prior exam  dated 04/26/2024. A residual 0.9 cm encapsulated macroscopic fat  containing focus within the right anterior pelvis is most compatible  with an  "area of fat necrosis. No inflammatory bowel wall thickening  or dilatation on current exam.  2. Mild circumferential urinary bladder wall thickening which may be  secondary to underdistention, although correlate with urinalysis and  concern for acute cystitis.  3. Remaining chronic and incidental findings are unchanged as  described above.      I personally reviewed the images/study and I agree with the resident  findings as stated. This study was interpreted at [facility], [place], [place].      MACRO:  None      Signed by: Nguyễn Keith 5/18/2024 3:06 PM  Dictation workstation:   [id number]         /82 (BP Location: Right arm, Patient Position: Sitting, BP Cuff Size: Adult)   Pulse 77   Temp 36.6 °C (97.8 °F) (Temporal)   Resp 16   Ht 1.727 m (5' 8\")   Wt 72 kg (158 lb 12.8 oz)   SpO2 98%   BMI 24.15 kg/m²      Physical Exam  Abdominal:      Comments: Nontender    No mass              Assessment/    Appendicitis, resolved      Plan/    The issue of interval appendectomy reviewed with the patient.  I do not see an appendiceal tumor or fecalith that would argue strongly for interval appendectomy.    I reviewed with the patient that current treatment for appendicitis with antibiotics alone usually succeeds approximately 85% of the time.  There is a 30-40% chance of recurrent appendicitis necessitating appendectomy in the first year.  Interval appendectomy is presented as an option however is not required.  Patient elects for not having further surgery.    Colonoscopy is required patient understands this and this is scheduled  "

## 2024-05-30 ENCOUNTER — PATIENT OUTREACH (OUTPATIENT)
Dept: CARE COORDINATION | Facility: CLINIC | Age: 64
End: 2024-05-30
Payer: COMMERCIAL

## 2024-05-30 NOTE — PROGRESS NOTES
Successful outreach to patient regarding hospitalization as patient continues TCM program.   At time of outreach call the patient feels as if their condition has improved since initial visit with PCP or specialist. Pt in good spirits at time of call. Denies any nausea, vomiting, or pain. Pt has had a repeat CT scan and no indication for surgery.  Questions or concerns addressed at this time with patient.   Provided contact information to patient if any further non-emergent needs arise.

## 2024-06-03 ENCOUNTER — ANESTHESIA EVENT (OUTPATIENT)
Dept: GASTROENTEROLOGY | Facility: EXTERNAL LOCATION | Age: 64
End: 2024-06-03

## 2024-06-17 RX ORDER — ONDANSETRON HYDROCHLORIDE 2 MG/ML
4 INJECTION, SOLUTION INTRAVENOUS ONCE AS NEEDED
Status: CANCELLED | OUTPATIENT
Start: 2024-06-17

## 2024-06-17 RX ORDER — SODIUM CHLORIDE 9 MG/ML
20 INJECTION, SOLUTION INTRAVENOUS CONTINUOUS
Status: CANCELLED | OUTPATIENT
Start: 2024-06-17

## 2024-06-18 ENCOUNTER — APPOINTMENT (OUTPATIENT)
Dept: GASTROENTEROLOGY | Facility: EXTERNAL LOCATION | Age: 64
End: 2024-06-18
Payer: COMMERCIAL

## 2024-06-18 ENCOUNTER — ANESTHESIA (OUTPATIENT)
Dept: GASTROENTEROLOGY | Facility: EXTERNAL LOCATION | Age: 64
End: 2024-06-18

## 2024-06-18 VITALS
RESPIRATION RATE: 13 BRPM | BODY MASS INDEX: 25.11 KG/M2 | HEART RATE: 62 BPM | DIASTOLIC BLOOD PRESSURE: 59 MMHG | TEMPERATURE: 97.3 F | WEIGHT: 160 LBS | SYSTOLIC BLOOD PRESSURE: 114 MMHG | HEIGHT: 67 IN | OXYGEN SATURATION: 99 %

## 2024-06-18 DIAGNOSIS — K36 OTHER APPENDICITIS: ICD-10-CM

## 2024-06-18 PROBLEM — F12.90 MARIJUANA SMOKER: Status: ACTIVE | Noted: 2024-06-18

## 2024-06-18 PROCEDURE — 45385 COLONOSCOPY W/LESION REMOVAL: CPT | Performed by: STUDENT IN AN ORGANIZED HEALTH CARE EDUCATION/TRAINING PROGRAM

## 2024-06-18 RX ORDER — SODIUM CHLORIDE 9 MG/ML
INJECTION, SOLUTION INTRAVENOUS CONTINUOUS PRN
Status: DISCONTINUED | OUTPATIENT
Start: 2024-06-18 | End: 2024-06-18

## 2024-06-18 RX ORDER — LIDOCAINE HYDROCHLORIDE 20 MG/ML
INJECTION, SOLUTION INFILTRATION; PERINEURAL AS NEEDED
Status: DISCONTINUED | OUTPATIENT
Start: 2024-06-18 | End: 2024-06-18

## 2024-06-18 RX ORDER — PROPOFOL 10 MG/ML
INJECTION, EMULSION INTRAVENOUS AS NEEDED
Status: DISCONTINUED | OUTPATIENT
Start: 2024-06-18 | End: 2024-06-18

## 2024-06-18 SDOH — HEALTH STABILITY: MENTAL HEALTH: CURRENT SMOKER: 1

## 2024-06-18 ASSESSMENT — PATIENT HEALTH QUESTIONNAIRE - PHQ9
SUM OF ALL RESPONSES TO PHQ9 QUESTIONS 1 AND 2: 0
2. FEELING DOWN, DEPRESSED OR HOPELESS: NOT AT ALL
1. LITTLE INTEREST OR PLEASURE IN DOING THINGS: NOT AT ALL

## 2024-06-18 ASSESSMENT — COLUMBIA-SUICIDE SEVERITY RATING SCALE - C-SSRS
6. HAVE YOU EVER DONE ANYTHING, STARTED TO DO ANYTHING, OR PREPARED TO DO ANYTHING TO END YOUR LIFE?: NO
2. HAVE YOU ACTUALLY HAD ANY THOUGHTS OF KILLING YOURSELF?: NO
1. IN THE PAST MONTH, HAVE YOU WISHED YOU WERE DEAD OR WISHED YOU COULD GO TO SLEEP AND NOT WAKE UP?: NO

## 2024-06-18 ASSESSMENT — PAIN SCALES - GENERAL
PAIN_LEVEL: 0
PAINLEVEL_OUTOF10: 0 - NO PAIN

## 2024-06-18 ASSESSMENT — PAIN - FUNCTIONAL ASSESSMENT
PAIN_FUNCTIONAL_ASSESSMENT: 0-10

## 2024-06-18 ASSESSMENT — ENCOUNTER SYMPTOMS
OCCASIONAL FEELINGS OF UNSTEADINESS: 0
LOSS OF SENSATION IN FEET: 0
DEPRESSION: 0

## 2024-06-18 NOTE — ANESTHESIA POSTPROCEDURE EVALUATION
Patient: Aruna De La Rosa    Procedure Summary       Date: 06/18/24 Room / Location: [facility]    Anesthesia Start: 0941 Anesthesia Stop:     Procedure: COLONOSCOPY Diagnosis: Other appendicitis    Scheduled Providers: Dorie Cohen MD Responsible Provider: MARIELENA Crawford    Anesthesia Type: MAC ASA Status: 2            Anesthesia Type: MAC    Vitals Value Taken Time   /48 06/18/24 1008   Temp 36.3 °C (97.3 °F) 06/18/24 1007   Pulse 79 06/18/24 1007   Resp 14 06/18/24 1007   SpO2 98 % 06/18/24 1007       Anesthesia Post Evaluation    Patient location during evaluation: bedside  Patient participation: complete - patient participated  Level of consciousness: awake  Pain score: 0  Pain management: adequate  Airway patency: patent  Cardiovascular status: acceptable  Respiratory status: acceptable and room air  Hydration status: acceptable  Postoperative Nausea and Vomiting: none      No notable events documented.

## 2024-06-18 NOTE — H&P
Procedure H&P    Patient Profile-Procedures  Name Aruna De La Rosa  Date of Birth 1960  MRN [id number]  Address   [address] [facility] [id number] [facility] [id number]    Primary Phone Number [phone]  Secondary Phone Number    Rajiv Vicente    Procedure(s):  Procedures: Colonoscopy  Primary contact name and number   Extended Emergency Contact Information  Primary Emergency Contact: Jamila Browning  Mobile Phone: [phone]  Relation: Daughter    General Health  Weight   Vitals:    06/18/24 0856   Weight: 72.6 kg (160 lb)     BMI Body mass index is 25.06 kg/m².    Allergies  No Known Allergies    Past Medical History   Past Medical History:   Diagnosis Date    Encounter for immunization 03/03/2021    Encounter for immunization    Encounter for screening for malignant neoplasm of colon 03/04/2020    Screen for colon cancer    Other conditions influencing health status 05/13/2021    History of cough    Other malaise     Malaise and fatigue    Other specified symptoms and signs involving the circulatory and respiratory systems 05/13/2021    Chest congestion    Personal history of other infectious and parasitic diseases 03/31/2022    History of candidiasis of vagina    Personal history of other medical treatment     History of screening mammography    Shortness of breath 05/07/2021    Increasing shortness of breath    Unspecified acute lower respiratory infection 05/13/2021    Lower resp. tract infection       Provider assessment  Diagnosis: Colon Cancer Screening/Surveillance   Medication Reviewed - yes  Prior to Admission medications    Medication Sig Start Date End Date Taking? Authorizing Provider   albuterol 90 mcg/actuation inhaler INHALE 1 PUFF BY MOUTH EVERY 6 HOURS IF NEEDED FOR WHEEZING OR SHORTNESS OF BREATH 5/10/24  Yes Rajiv Soriano,    olmesartan (BENIcar) 20 mg tablet Take 1 tablet (20 mg) by mouth once daily. 3/1/24  Yes Rajiv Soriano DO   rosuvastatin  (Crestor) 10 mg tablet Take 1 tablet (10 mg) by mouth once daily. 3/1/24  Yes Rajiv Soriano DO   Trelegy Ellipta 100-62.5-25 mcg blister with device INHALE ONE PUFF BY MOUTH ONCE DAILY 3/14/24  Yes Rajiv Soriano DO   blood sugar diagnostic (Blood Glucose Test) strip Use to test glucose BID 4/29/24   Rajiv Soriano DO   lancets (Accu-Chek Softclix Lancets) misc USE TO TEST BLOOD SUGAR TWICE DAILY 4/24/24   Rajiv Soriano DO   sodium,potassium,mag sulfates (Suprep) 17.5-3.13-1.6 gram recon soln solution Take 2 bottles by mouth see administration instructions. 5/8/24 6/18/24  Dorie Cohen MD       Physical Exam  Vitals:    06/18/24 0900   BP: 145/55   Pulse:    Resp:    Temp:         General: A&Ox3, NAD.  HEENT: AT/NC.   CV: RRR. No murmur.  Resp: CTA bilaterally. No wheezing, rhonchi or rales.   GI: Soft, NT/ND. BSx4.  Extrem: No edema. Pulses intact.  Neuro: No focal deficits.   Psych: Normal mood and affect.      Procedure Plan - pre-procedural (re)assesment completed by physician:  discharge/transfer patient when discharge criteria met    Dorie Cohen MD  6/18/2024 9:32 AM

## 2024-06-18 NOTE — DISCHARGE INSTRUCTIONS
Patient Instructions Post Procedure      The anesthetics, sedatives or narcotics which were given to you today will be acting in your body for the next 24 hours, so you might feel a little sleepy or groggy.  This feeling should slowly wear off. Carefully read and follow the instructions.     You received sedation today:  - Do not drive or operate any machinery or power tools of any kind.   - No alcoholic beverages today, not even beer or wine.  - Do not make any important decisions or sign any legal documents.  - No over the counter medications that contain alcohol or that may cause drowsiness.    While it is common to experience mild to moderate abdominal distention, gas, or belching after your procedure, if any of these symptoms occur following discharge from the GI Lab or within one week of having your procedure, call the [facility] to be advised whether a visit to your nearest Urgent Care or Emergency Department is indicated.  Take this paper with you if you go.   - If you develop an allergic reaction to the medications that were given during your procedure such as difficulty breathing, rash, hives, severe nausea, vomiting or lightheadedness.  - If you experience chest pain, shortness of breath, severe abdominal pain, fevers and chills.  -If you develop signs and symptoms of bleeding such as blood in your spit, if your stools turn black, tarry, or bloody  - If you have not urinated within 8 hours following your procedure.  - If your IV site becomes painful, red, inflamed, or looks infected.    If you received a biopsy/polypectomy/sphincterotomy the following instructions apply below:  _X_ Do not use Aspirin containing products, non-steroidal medications or anti-coagulants for one week following your procedure. (Examples of these types of medications are: Advil, Arthrotec, Aleve, Coumadin, Ecotrin, Heparin, Ibuprofen, Indocin, Motrin, Naprosyn, Nuprin, Plavix, Vioxx, and Voltarin, or their generic  forms.  This list is not all-inclusive.  Check with your physician or pharmacist before resuming medications.)   __ Eat a soft diet today.  Avoid foods that are poorly digested for the next 24 hours.  These foods would include: nuts, beans, lettuce, red meats, and fried foods. Start with liquids and advance your diet as tolerated, gradually work up to eating solids.   __ Do not have a Barium Study or Enema for one week.    Your physician recommends the additional following instructions:    -You have a contact number available for emergencies. The signs and symptoms of potential delayed complications were discussed with you. You may return to normal activities tomorrow.  -Resume your previous diet or other if specified.  -Continue your present medications.   -We are waiting for your pathology results, if applicable.  -The findings and recommendations have been discussed with you and/or family.  - Please see Medication Reconciliation Form for new medication/medications prescribed.     If you experience any problems or have any questions following discharge from the GI Lab, please call: [phone] from 7 am- 4:30 pm.  In the event of an emergency please go to the closest Emergency Department or call Dr. Cohen at [phone].

## 2024-06-18 NOTE — ANESTHESIA PREPROCEDURE EVALUATION
Patient: Aruna De La Rosa    Procedure Information       Date/Time: 06/18/24 1000    Scheduled providers: Dorie Cohen MD    Procedure: COLONOSCOPY    Location: [facility]            Relevant Problems   Cardiac   (+) HTN (hypertension)      Pulmonary   (+) Asthma (HHS-HCC)   (+) Asthma exacerbation (HHS-HCC)      Neuro   (+) Anxiety disorder   (+) PN (peripheral neuropathy)      Endocrine   (+) Type 2 diabetes mellitus without complication (Multi)      Circulatory   (+) Borderline hypertension      Advance Directives and General Issues   (+) Marijuana smoker       Clinical information reviewed:   Tobacco  Allergies  Meds   Med Hx  Surg Hx  OB Status  Fam Hx  Soc   Hx        NPO Detail:  NPO/Void Status  Carbohydrate Drink Given Prior to Surgery? : N  Date of Last Liquid: 06/18/24  Time of Last Liquid: 0400  Date of Last Solid: 06/16/24  Time of Last Solid: 2000  Last Intake Type: Clear fluids  Time of Last Void: 0857         Physical Exam    Airway  Mallampati: II  TM distance: >3 FB  Neck ROM: full     Cardiovascular - normal exam     Dental - normal exam     Pulmonary - normal exam     Abdominal - normal exam         Anesthesia Plan    History of general anesthesia?: yes  History of complications of general anesthesia?: no    ASA 2     MAC   (Preoxygenated 2L prior to procedure.  Patient positioned self to comfort prior to sedation administered; eyes closed; continuous monitoring)  The patient is a current smoker.  Patient was previously instructed to abstain from smoking on day of procedure.  Patient did not smoke on day of procedure.    intravenous induction   Anesthetic plan and risks discussed with patient.    Plan discussed with CRNA.

## 2024-06-28 LAB
LABORATORY COMMENT REPORT: NORMAL
PATH REPORT.FINAL DX SPEC: NORMAL
PATH REPORT.GROSS SPEC: NORMAL
PATH REPORT.MICROSCOPIC SPEC OTHER STN: NORMAL
PATH REPORT.RELEVANT HX SPEC: NORMAL
PATH REPORT.TOTAL CANCER: NORMAL

## 2024-07-24 DIAGNOSIS — J45.909 ASTHMA, UNSPECIFIED ASTHMA SEVERITY, UNSPECIFIED WHETHER COMPLICATED, UNSPECIFIED WHETHER PERSISTENT (HHS-HCC): ICD-10-CM

## 2024-07-24 RX ORDER — FLUTICASONE FUROATE, UMECLIDINIUM BROMIDE AND VILANTEROL TRIFENATATE 100; 62.5; 25 UG/1; UG/1; UG/1
1 POWDER RESPIRATORY (INHALATION) DAILY
Qty: 25 EACH | Refills: 3 | Status: SHIPPED | OUTPATIENT
Start: 2024-07-24

## 2024-07-26 ENCOUNTER — PATIENT OUTREACH (OUTPATIENT)
Dept: CARE COORDINATION | Facility: CLINIC | Age: 64
End: 2024-07-26
Payer: COMMERCIAL

## 2024-09-19 DIAGNOSIS — E78.2 MIXED HYPERLIPIDEMIA: Primary | ICD-10-CM

## 2024-09-19 RX ORDER — ROSUVASTATIN CALCIUM 10 MG/1
10 TABLET, COATED ORAL DAILY
Qty: 30 TABLET | Refills: 2 | Status: SHIPPED | OUTPATIENT
Start: 2024-09-19

## 2024-11-30 DIAGNOSIS — J45.909 ASTHMA, UNSPECIFIED ASTHMA SEVERITY, UNSPECIFIED WHETHER COMPLICATED, UNSPECIFIED WHETHER PERSISTENT (HHS-HCC): ICD-10-CM

## 2024-12-02 RX ORDER — FLUTICASONE FUROATE, UMECLIDINIUM BROMIDE AND VILANTEROL TRIFENATATE 100; 62.5; 25 UG/1; UG/1; UG/1
POWDER RESPIRATORY (INHALATION)
Qty: 25 EACH | Refills: 3 | Status: SHIPPED | OUTPATIENT
Start: 2024-12-02

## 2024-12-19 DIAGNOSIS — E78.2 MIXED HYPERLIPIDEMIA: ICD-10-CM

## 2024-12-19 RX ORDER — ROSUVASTATIN CALCIUM 10 MG/1
10 TABLET, COATED ORAL DAILY
Qty: 30 TABLET | Refills: 0 | Status: SHIPPED | OUTPATIENT
Start: 2024-12-19

## 2024-12-19 NOTE — TELEPHONE ENCOUNTER
Last seen in May, please call and schedule appointment.  Can send in short supply if needed, just let us know.  Thanks ladies!      Mychart message sent.

## 2024-12-31 DIAGNOSIS — I10 HTN (HYPERTENSION), BENIGN: ICD-10-CM

## 2024-12-31 RX ORDER — OLMESARTAN MEDOXOMIL 20 MG/1
20 TABLET ORAL DAILY
Qty: 30 TABLET | Refills: 0 | Status: SHIPPED | OUTPATIENT
Start: 2024-12-31

## 2024-12-31 NOTE — TELEPHONE ENCOUNTER
Last seen 5/1/24--- [facility] message sent for appt needed  Medication pended

## 2025-02-25 ENCOUNTER — OFFICE VISIT (OUTPATIENT)
Dept: URGENT CARE | Age: 65
End: 2025-02-25
Payer: COMMERCIAL

## 2025-02-25 VITALS
HEART RATE: 84 BPM | RESPIRATION RATE: 18 BRPM | WEIGHT: 172 LBS | TEMPERATURE: 98.8 F | DIASTOLIC BLOOD PRESSURE: 83 MMHG | HEIGHT: 67 IN | OXYGEN SATURATION: 96 % | BODY MASS INDEX: 27 KG/M2 | SYSTOLIC BLOOD PRESSURE: 151 MMHG

## 2025-02-25 DIAGNOSIS — J06.9 ACUTE URI: Primary | ICD-10-CM

## 2025-02-25 DIAGNOSIS — R05.1 ACUTE COUGH: ICD-10-CM

## 2025-02-25 LAB
POC RAPID INFLUENZA A: NEGATIVE
POC RAPID INFLUENZA B: NEGATIVE

## 2025-02-25 RX ORDER — FLUTICASONE PROPIONATE 50 MCG
2 SPRAY, SUSPENSION (ML) NASAL DAILY
Qty: 16 G | Refills: 0 | Status: SHIPPED | OUTPATIENT
Start: 2025-02-25 | End: 2026-02-25

## 2025-02-25 RX ORDER — ALBUTEROL SULFATE 90 UG/1
2 INHALANT RESPIRATORY (INHALATION) EVERY 6 HOURS PRN
Qty: 18 G | Refills: 0 | Status: SHIPPED | OUTPATIENT
Start: 2025-02-25 | End: 2026-02-25

## 2025-02-25 RX ORDER — METHYLPREDNISOLONE 4 MG/1
TABLET ORAL
Qty: 21 TABLET | Refills: 0 | Status: SHIPPED | OUTPATIENT
Start: 2025-02-25 | End: 2025-03-03

## 2025-02-25 ASSESSMENT — PAIN SCALES - GENERAL: PAINLEVEL_OUTOF10: 0-NO PAIN

## 2025-02-25 ASSESSMENT — ENCOUNTER SYMPTOMS
HEADACHES: 1
COUGH: 1

## 2025-02-25 NOTE — PROGRESS NOTES
Subjective   Patient ID: Aruna De La Rosa is a 64 y.o. female. They present today with a chief complaint of Cough, Nasal Congestion, and Headache (X 1 day).    History of Present Illness  Pt presents to the  with the c/o cough, nasal congestion, cough and frontal headache. Not the worst headache of her life. She denies fever, sob, cp or pain with deep inspiration. She has no other associated symptoms. No other concerns to address. She has taken nothing for her symptoms. Symptoms started 1 day ago. She is a former smoker of cigarettes; hx of asthma and on daily inhaler.       Cough  Associated symptoms include headaches.   Headache  Associated symptoms: cough        Past Medical History  Allergies as of 02/25/2025    (No Known Allergies)       (Not in a hospital admission)       Past Medical History:   Diagnosis Date    Encounter for immunization 03/03/2021    Encounter for immunization    Encounter for screening for malignant neoplasm of colon 03/04/2020    Screen for colon cancer    Other conditions influencing health status 05/13/2021    History of cough    Other malaise     Malaise and fatigue    Other specified symptoms and signs involving the circulatory and respiratory systems 05/13/2021    Chest congestion    Personal history of other infectious and parasitic diseases 03/31/2022    History of candidiasis of vagina    Personal history of other medical treatment     History of screening mammography    Shortness of breath 05/07/2021    Increasing shortness of breath    Unspecified acute lower respiratory infection 05/13/2021    Lower resp. tract infection       Past Surgical History:   Procedure Laterality Date    OTHER SURGICAL HISTORY  02/03/2020    Shoulder surgery    OTHER SURGICAL HISTORY  02/03/2020    Knee arthroscopy    OTHER SURGICAL HISTORY  02/03/2020    Elbow arthroscopy        reports that she has quit smoking. Her smoking use included cigarettes. She has never used smokeless tobacco. She reports  "current drug use. Drug: Marijuana. She reports that she does not drink alcohol.    Review of Systems  Review of Systems   Respiratory:  Positive for cough.    Neurological:  Positive for headaches.     10 point ROS completed and all are negative other than what is stated in the current HPI                               Objective    Vitals:    02/25/25 0936   BP: 151/83   BP Location: Right arm   Patient Position: Sitting   Pulse: 84   Resp: 18   Temp: 37.1 °C (98.8 °F)   TempSrc: Oral   SpO2: 96%   Weight: 78 kg (172 lb)   Height: 1.702 m (5' 7\")     No LMP recorded. Patient is postmenopausal.    Physical Exam  Vitals and nursing note reviewed.   Constitutional:       Appearance: Normal appearance.   HENT:      Head: Normocephalic and atraumatic.      Nose: Congestion present.      Comments: No decreased transillumination to the frontal or maxillary sinuses     Mouth/Throat:      Mouth: Mucous membranes are moist.   Eyes:      Pupils: Pupils are equal, round, and reactive to light.   Cardiovascular:      Rate and Rhythm: Normal rate and regular rhythm.      Heart sounds: Normal heart sounds.   Pulmonary:      Effort: Pulmonary effort is normal.      Breath sounds: Normal breath sounds. No wheezing or rhonchi.   Skin:     General: Skin is warm and dry.   Neurological:      Mental Status: She is alert and oriented to person, place, and time.   Psychiatric:         Behavior: Behavior normal.         Procedures    Point of Care Test & Imaging Results from this visit  No results found for this visit on 02/25/25.   No results found.    Diagnostic study results (if any) were reviewed by NIMESH Mohan.    Assessment/Plan   Allergies, medications, history, and pertinent labs/EKGs/Imaging reviewed by NIMESH Mohan.     Medical Decision Making  Acute Upper Respiratory Illness:  - x 1 day; no fever, sob, cp or pain with deep inspiration  - Good oral hydration; avoid milk products  - Naldo's Vapor " rub; humidifier; warm showers  - OTC symptomatic treatment at this time  - Advised on s/s to seek emergent care for  - f/u with PCP in the next 3-5 days if no better  - Although lungs are clear; with hx of asthma will start albuterol inhaler as needed for cough; continue using daily inhaler;medrol dose pack ordered; OTC cough medication and decongestant; Flonase for nasal congestion and daily antihistamine    Orders and Diagnoses  There are no diagnoses linked to this encounter.    Medical Admin Record      Patient disposition: Home    Electronically signed by NIMESH Mohan  9:45 AM

## 2025-02-25 NOTE — PATIENT INSTRUCTIONS
Acute Upper Respiratory Illness:  - x 1 day; no fever, sob, cp or pain with deep inspiration  - Good oral hydration; avoid milk products  - Naldo's Vapor rub; humidifier; warm showers  - OTC symptomatic treatment at this time  - Advised on s/s to seek emergent care for  - f/u with PCP in the next 3-5 days if no better  - Although lungs are clear; with hx of asthma will start albuterol inhaler as needed for cough; continue using daily inhaler;medrol dose pack ordered; OTC cough medication and decongestant; Flonase for nasal congestion and daily antihistamine

## 2025-02-27 ENCOUNTER — OFFICE VISIT (OUTPATIENT)
Dept: URGENT CARE | Age: 65
End: 2025-02-27
Payer: COMMERCIAL

## 2025-02-27 VITALS
BODY MASS INDEX: 27 KG/M2 | RESPIRATION RATE: 20 BRPM | DIASTOLIC BLOOD PRESSURE: 76 MMHG | HEIGHT: 67 IN | HEART RATE: 89 BPM | SYSTOLIC BLOOD PRESSURE: 170 MMHG | OXYGEN SATURATION: 95 % | TEMPERATURE: 97 F | WEIGHT: 172 LBS

## 2025-02-27 DIAGNOSIS — R05.1 ACUTE COUGH: ICD-10-CM

## 2025-02-27 DIAGNOSIS — R51.9 ACUTE INTRACTABLE HEADACHE, UNSPECIFIED HEADACHE TYPE: Primary | ICD-10-CM

## 2025-02-27 PROCEDURE — 1159F MED LIST DOCD IN RCRD: CPT | Performed by: FAMILY MEDICINE

## 2025-02-27 PROCEDURE — 1036F TOBACCO NON-USER: CPT | Performed by: FAMILY MEDICINE

## 2025-02-27 PROCEDURE — 3008F BODY MASS INDEX DOCD: CPT | Performed by: FAMILY MEDICINE

## 2025-02-27 PROCEDURE — 99213 OFFICE O/P EST LOW 20 MIN: CPT | Performed by: FAMILY MEDICINE

## 2025-02-27 PROCEDURE — 4010F ACE/ARB THERAPY RXD/TAKEN: CPT | Performed by: FAMILY MEDICINE

## 2025-02-27 PROCEDURE — 1160F RVW MEDS BY RX/DR IN RCRD: CPT | Performed by: FAMILY MEDICINE

## 2025-02-27 PROCEDURE — 3077F SYST BP >= 140 MM HG: CPT | Performed by: FAMILY MEDICINE

## 2025-02-27 PROCEDURE — 3078F DIAST BP <80 MM HG: CPT | Performed by: FAMILY MEDICINE

## 2025-02-27 RX ORDER — BENZONATATE 200 MG/1
200 CAPSULE ORAL 3 TIMES DAILY PRN
Qty: 30 CAPSULE | Refills: 0 | Status: SHIPPED | OUTPATIENT
Start: 2025-02-27 | End: 2025-03-09

## 2025-02-27 ASSESSMENT — ENCOUNTER SYMPTOMS
SINUS PAIN: 0
EYE PAIN: 0
COUGH: 1
SORE THROAT: 0
FEVER: 0
SINUS PRESSURE: 0
CHILLS: 0
SHORTNESS OF BREATH: 1
CHEST TIGHTNESS: 0
EYE REDNESS: 0
WHEEZING: 1
EYE DISCHARGE: 0
HEADACHES: 1

## 2025-02-27 NOTE — LETTER
February 27, 2025     Patient: Aruna De La Rosa   YOB: 1960   Date of Visit: 2/27/2025       To Whom It May Concern:    Aruna De La Rosa was seen in my clinic on 2/27/2025 at 10:15 am. Please excuse Aruna for her absence from work on this day to make the appointment. Please excuse Tuesday 2/25/25-2/28/25.    If you have any questions or concerns, please don't hesitate to call.         Sincerely,         Gilson Lee,         CC: No Recipients

## 2025-02-27 NOTE — PROGRESS NOTES
Subjective   Patient ID: Aruna De La Rosa is a 64 y.o. female. They present today with a chief complaint of Headache (Headache and cough is the same since last visit per pt. ).    History of Present Illness    History provided by:  Patient   used: No    Headache  Pain location:  R temporal and L temporal  Quality: Pressure.  Severity currently:  7/10  Onset quality:  Sudden  Duration:  3 days  Timing:  Constant  Progression:  Unchanged  Chronicity:  New  Ineffective treatments:  None tried  Associated symptoms: cough    Associated symptoms: no ear pain, no eye pain, no fever, no sinus pressure and no sore throat        Past Medical History  Allergies as of 02/27/2025    (No Known Allergies)       (Not in a hospital admission)       Past Medical History:   Diagnosis Date    Encounter for immunization 03/03/2021    Encounter for immunization    Encounter for screening for malignant neoplasm of colon 03/04/2020    Screen for colon cancer    Other conditions influencing health status 05/13/2021    History of cough    Other malaise     Malaise and fatigue    Other specified symptoms and signs involving the circulatory and respiratory systems 05/13/2021    Chest congestion    Personal history of other infectious and parasitic diseases 03/31/2022    History of candidiasis of vagina    Personal history of other medical treatment     History of screening mammography    Shortness of breath 05/07/2021    Increasing shortness of breath    Unspecified acute lower respiratory infection 05/13/2021    Lower resp. tract infection       Past Surgical History:   Procedure Laterality Date    OTHER SURGICAL HISTORY  02/03/2020    Shoulder surgery    OTHER SURGICAL HISTORY  02/03/2020    Knee arthroscopy    OTHER SURGICAL HISTORY  02/03/2020    Elbow arthroscopy        reports that she has quit smoking. Her smoking use included cigarettes. She has never used smokeless tobacco. She reports current drug use. Drug:  "Marijuana. She reports that she does not drink alcohol.    Review of Systems  Review of Systems   Constitutional:  Negative for chills and fever.   HENT:  Negative for ear pain, sinus pressure, sinus pain and sore throat.    Eyes:  Negative for pain, discharge and redness.   Respiratory:  Positive for cough, shortness of breath and wheezing. Negative for chest tightness.    Cardiovascular:  Negative for chest pain.   Neurological:  Positive for headaches.                                  Objective    Vitals:    02/27/25 1001   BP: 170/76   BP Location: Left arm   Patient Position: Sitting   BP Cuff Size: Adult   Pulse: 89   Resp: 20   Temp: 36.1 °C (97 °F)   TempSrc: Temporal   SpO2: 95%   Weight: 78 kg (172 lb)   Height: 1.702 m (5' 7\")     No LMP recorded. Patient is postmenopausal.    Physical Exam  Vitals reviewed.   Constitutional:       General: She is not in acute distress.     Appearance: She is not ill-appearing.   HENT:      Right Ear: Tympanic membrane and ear canal normal.      Left Ear: Tympanic membrane and ear canal normal.      Nose: Nose normal.      Mouth/Throat:      Mouth: Mucous membranes are moist.      Pharynx: No posterior oropharyngeal erythema.   Eyes:      Extraocular Movements: Extraocular movements intact.      Conjunctiva/sclera: Conjunctivae normal.      Pupils: Pupils are equal, round, and reactive to light.   Cardiovascular:      Rate and Rhythm: Normal rate and regular rhythm.      Heart sounds: No murmur heard.     No friction rub.   Pulmonary:      Effort: Pulmonary effort is normal. No respiratory distress.      Breath sounds: Examination of the right-lower field reveals wheezing. Wheezing present. No rhonchi or rales.   Lymphadenopathy:      Cervical: No cervical adenopathy.   Neurological:      Mental Status: She is alert.         Procedures    Point of Care Test & Imaging Results from this visit  No results found for this visit on 02/27/25.   No results found.    Diagnostic " study results (if any) were reviewed by Gilson Lee DO.    Assessment/Plan   Allergies, medications, history, and pertinent labs/EKGs/Imaging reviewed by Gilson Lee DO.         Orders and Diagnoses  There are no diagnoses linked to this encounter.    Medical Admin Record      Patient disposition: Home    Electronically signed by Gilson Lee DO  10:07 AM

## 2025-03-26 DIAGNOSIS — I10 HTN (HYPERTENSION), BENIGN: ICD-10-CM

## 2025-03-26 RX ORDER — OLMESARTAN MEDOXOMIL 20 MG/1
20 TABLET ORAL DAILY
Qty: 30 TABLET | Refills: 0 | Status: SHIPPED | OUTPATIENT
Start: 2025-03-26

## 2025-03-26 NOTE — TELEPHONE ENCOUNTER
Last seen 5/1/24  Medication pended 30 day supply and a [employer] message sent to schedule an appointment.

## 2025-04-20 DIAGNOSIS — J45.909 ASTHMA, UNSPECIFIED ASTHMA SEVERITY, UNSPECIFIED WHETHER COMPLICATED, UNSPECIFIED WHETHER PERSISTENT (HHS-HCC): ICD-10-CM

## 2025-04-22 RX ORDER — FLUTICASONE FUROATE, UMECLIDINIUM BROMIDE AND VILANTEROL TRIFENATATE 100; 62.5; 25 UG/1; UG/1; UG/1
1 POWDER RESPIRATORY (INHALATION) DAILY
Qty: 28 EACH | Refills: 0 | Status: SHIPPED | OUTPATIENT
Start: 2025-04-22

## 2025-04-23 DIAGNOSIS — I10 HTN (HYPERTENSION), BENIGN: ICD-10-CM

## 2025-04-23 RX ORDER — OLMESARTAN MEDOXOMIL 20 MG/1
20 TABLET ORAL DAILY
Qty: 30 TABLET | Refills: 0 | Status: SHIPPED | OUTPATIENT
Start: 2025-04-23

## 2025-04-30 ENCOUNTER — APPOINTMENT (OUTPATIENT)
Dept: PRIMARY CARE | Facility: CLINIC | Age: 65
End: 2025-04-30
Payer: COMMERCIAL

## 2025-04-30 VITALS
OXYGEN SATURATION: 98 % | DIASTOLIC BLOOD PRESSURE: 80 MMHG | WEIGHT: 170.4 LBS | HEART RATE: 65 BPM | SYSTOLIC BLOOD PRESSURE: 128 MMHG | TEMPERATURE: 97.8 F | BODY MASS INDEX: 26.74 KG/M2 | HEIGHT: 67 IN

## 2025-04-30 DIAGNOSIS — Z00.00 ROUTINE GENERAL MEDICAL EXAMINATION AT A HEALTH CARE FACILITY: Primary | ICD-10-CM

## 2025-04-30 DIAGNOSIS — Z12.31 SCREENING MAMMOGRAM FOR BREAST CANCER: ICD-10-CM

## 2025-04-30 DIAGNOSIS — E78.2 MIXED HYPERLIPIDEMIA: ICD-10-CM

## 2025-04-30 DIAGNOSIS — J45.909 ASTHMA, UNSPECIFIED ASTHMA SEVERITY, UNSPECIFIED WHETHER COMPLICATED, UNSPECIFIED WHETHER PERSISTENT (HHS-HCC): ICD-10-CM

## 2025-04-30 DIAGNOSIS — I10 HTN (HYPERTENSION), BENIGN: ICD-10-CM

## 2025-04-30 DIAGNOSIS — I10 PRIMARY HYPERTENSION: ICD-10-CM

## 2025-04-30 DIAGNOSIS — E11.9 TYPE 2 DIABETES MELLITUS WITHOUT COMPLICATION, UNSPECIFIED WHETHER LONG TERM INSULIN USE: ICD-10-CM

## 2025-04-30 PROCEDURE — 1036F TOBACCO NON-USER: CPT | Performed by: FAMILY MEDICINE

## 2025-04-30 PROCEDURE — 1159F MED LIST DOCD IN RCRD: CPT | Performed by: FAMILY MEDICINE

## 2025-04-30 PROCEDURE — 3008F BODY MASS INDEX DOCD: CPT | Performed by: FAMILY MEDICINE

## 2025-04-30 PROCEDURE — 99397 PER PM REEVAL EST PAT 65+ YR: CPT | Performed by: FAMILY MEDICINE

## 2025-04-30 PROCEDURE — 3079F DIAST BP 80-89 MM HG: CPT | Performed by: FAMILY MEDICINE

## 2025-04-30 PROCEDURE — 3074F SYST BP LT 130 MM HG: CPT | Performed by: FAMILY MEDICINE

## 2025-04-30 PROCEDURE — 4010F ACE/ARB THERAPY RXD/TAKEN: CPT | Performed by: FAMILY MEDICINE

## 2025-04-30 PROCEDURE — 1160F RVW MEDS BY RX/DR IN RCRD: CPT | Performed by: FAMILY MEDICINE

## 2025-04-30 RX ORDER — IBUPROFEN 200 MG
CAPSULE ORAL
Qty: 200 STRIP | Refills: 1 | Status: SHIPPED | OUTPATIENT
Start: 2025-04-30

## 2025-04-30 RX ORDER — FLUTICASONE FUROATE, UMECLIDINIUM BROMIDE AND VILANTEROL TRIFENATATE 100; 62.5; 25 UG/1; UG/1; UG/1
1 POWDER RESPIRATORY (INHALATION) DAILY
Qty: 90 EACH | Refills: 1 | Status: SHIPPED | OUTPATIENT
Start: 2025-04-30

## 2025-04-30 RX ORDER — OLMESARTAN MEDOXOMIL 20 MG/1
20 TABLET ORAL DAILY
Qty: 90 TABLET | Refills: 1 | Status: SHIPPED | OUTPATIENT
Start: 2025-04-30

## 2025-04-30 RX ORDER — ROSUVASTATIN CALCIUM 10 MG/1
10 TABLET, COATED ORAL DAILY
Qty: 90 TABLET | Refills: 1 | Status: SHIPPED | OUTPATIENT
Start: 2025-04-30

## 2025-04-30 NOTE — PROGRESS NOTES
"Subjective   Patient ID: Aruna De La Rosa is a 64 y.o. female who presents for Annual Exam, Hypertension, and Diabetes.  HPI    Patient presents in office today for an Annual physical. Last eye exam last week, last dental exam 2 months ago. No new family h/o of cancer or heart disease. Does not need paperwork filled out today.     Patient would like to have her right forearm examine due to some bites she had gotten when gardening last Sunday.     Patient prefers to try losing weight to improve blood sugar control before starting shots.    Patient's sister also had heart disease.      Review of systems  ; Patient seen today for exam denies any problems with headaches or vision, denies any shortness of breath chest pain nausea or vomiting, no black stool no blood in the stool no heartburn type symptoms denies any problems with constipation or diarrhea, and no dysuria-type symptoms    The patient's allergies medications were reviewed with them today    The patient's social family and surgical history or also reviewed here today, along with her past medical history.     Objective     Alert and active in  no acute distress  HEENT TMs clear oropharynx negative nares clear no drainage noted neck supple  With no adenopathy   Heart regular rate and rhythm without murmur and no carotid bruits  Lungs- clear to auscultation bilaterally, no wheeze or rhonchi noted  Thyroid -negative masses or nodularity  Abdomen- soft times four quadrants , bowel sounds positive no masses or organomegaly, negative tenderness guarding or rebound    skin -no lesions noted      /80 (BP Location: Left arm, Patient Position: Sitting, BP Cuff Size: Adult long)   Pulse 65   Temp 36.6 °C (97.8 °F) (Temporal)   Ht 1.702 m (5' 7\")   Wt 77.3 kg (170 lb 6.4 oz)   SpO2 98%   BMI 26.69 kg/m²     Allergies[1]    Assessment/Plan   Problem List Items Addressed This Visit       Asthma    Relevant Medications    fluticasone-umeclidin-vilanter (Trelegy " Ellipta) 100-62.5-25 mcg blister with device    HTN (hypertension)    Relevant Orders    CBC and Auto Differential    Comprehensive Metabolic Panel    CT cardiac scoring wo IV contrast    Type 2 diabetes mellitus without complication    Relevant Medications    blood sugar diagnostic (Blood Glucose Test)    Other Relevant Orders    Albumin-Creatinine Ratio, Urine Random    Hemoglobin A1C    CT cardiac scoring wo IV contrast    Routine general medical examination at a health care facility - Primary    Relevant Orders    Lipid Panel     Other Visit Diagnoses         HTN (hypertension), benign        Relevant Medications    olmesartan (BENIcar) 20 mg tablet      Mixed hyperlipidemia        Relevant Medications    rosuvastatin (Crestor) 10 mg tablet      Screening mammogram for breast cancer        Relevant Orders    BI mammo bilateral screening tomosynthesis      BMI 26.0-26.9,adult                Refilled Diabetic supplies.    Refilled Trelegy Ellipta inhaler, olmesartan, rosuvastatin.    Ordered CT cardiac scoring today to rule out any possible coronary artery blockages given family history of heart disease of her sister.    Labs have been ordered, she/he will have these performed and we will contact her/him with results.  (CBC, CMP, Lipid, A1c, Alb Cr)    Mammogram ordered today.    If anything worsens or changes please call us at once, follow up in the office as planned,       Scribe Attestation  By signing my name below, I, Pravin Fox   attest that this documentation has been prepared under the direction and in the presence of Rajiv Soriano DO.    All medical record entries made by the Scribe were at my direction and personally dictated by me.   I have reviewed the chart and agree that the record accurately reflects my personal performance of the history, physical exam, discussion, and plan.         [1] No Known Allergies

## 2025-05-29 ENCOUNTER — APPOINTMENT (OUTPATIENT)
Facility: HOSPITAL | Age: 65
End: 2025-05-29
Payer: COMMERCIAL

## 2025-07-24 DIAGNOSIS — J45.909 ASTHMA, UNSPECIFIED ASTHMA SEVERITY, UNSPECIFIED WHETHER COMPLICATED, UNSPECIFIED WHETHER PERSISTENT (HHS-HCC): ICD-10-CM

## 2025-07-25 RX ORDER — FLUTICASONE FUROATE, UMECLIDINIUM BROMIDE AND VILANTEROL TRIFENATATE 100; 62.5; 25 UG/1; UG/1; UG/1
POWDER RESPIRATORY (INHALATION)
Qty: 60 EACH | Refills: 1 | Status: SHIPPED | OUTPATIENT
Start: 2025-07-25

## 2025-08-04 ENCOUNTER — ANCILLARY PROCEDURE (OUTPATIENT)
Facility: HOSPITAL | Age: 65
End: 2025-08-04
Payer: COMMERCIAL

## 2025-08-04 DIAGNOSIS — I10 PRIMARY HYPERTENSION: ICD-10-CM

## 2025-08-04 DIAGNOSIS — E11.9 TYPE 2 DIABETES MELLITUS WITHOUT COMPLICATION, UNSPECIFIED WHETHER LONG TERM INSULIN USE: ICD-10-CM

## 2025-08-04 PROCEDURE — 75571 CT HRT W/O DYE W/CA TEST: CPT
